# Patient Record
Sex: FEMALE | Race: WHITE | NOT HISPANIC OR LATINO | Employment: OTHER | URBAN - METROPOLITAN AREA
[De-identification: names, ages, dates, MRNs, and addresses within clinical notes are randomized per-mention and may not be internally consistent; named-entity substitution may affect disease eponyms.]

---

## 2017-12-08 ENCOUNTER — PATIENT OUTREACH (OUTPATIENT)
Dept: HEALTH INFORMATION MANAGEMENT | Facility: OTHER | Age: 72
End: 2017-12-08

## 2017-12-08 NOTE — PROGRESS NOTES
Outcome: Left Message    Please transfer to Patient Outreach Team at [phone] when patient returns call.    WebIZ Checked & Epic Updated:  no    HealthConnect Verified: yes    Attempt # 1

## 2017-12-20 ENCOUNTER — PATIENT OUTREACH (OUTPATIENT)
Dept: HEALTH INFORMATION MANAGEMENT | Facility: OTHER | Age: 72
End: 2017-12-20

## 2017-12-20 NOTE — PROGRESS NOTES
Outcome: Left Message    Please transfer to Patient Outreach Team at [phone] when patient returns call.    Attempt # 2

## 2017-12-20 NOTE — PROGRESS NOTES
Attempt #:1    HealthConnect Verified: yes  Verify PCP: yes    Communication Preference Obtained: yes     Review Care Team: yes    Care Gap Scheduling (Attempt to Schedule EACH Overdue Care Gap!)     Health Maintenance Due   Topic Date Due   • Annual Wellness Visit  1945   • IMM DTaP/Tdap/Td Vaccine (1 - Tdap) 01/19/1966   • PAP SMEAR  01/19/1968   • COLONOSCOPY  01/19/1997   • IMM ZOSTER VACCINE  01/19/2007   • IMM PNEUMOCOCCAL 65+ (ADULT) LOW/MEDIUM RISK SERIES (1 of 2 - PCV13) 01/19/2012   • MAMMOGRAM  04/24/2016   • IMM INFLUENZA (1) 09/01/2017        Scheduled patient for Mammogram       [facility] Activation: already active  [facility] [place]: no  Virtual Visits: no  Opt In to Text Messages: no

## 2018-10-11 ENCOUNTER — HOSPITAL ENCOUNTER (OUTPATIENT)
Dept: LAB | Facility: MEDICAL CENTER | Age: 73
End: 2018-10-11
Attending: NURSE PRACTITIONER
Payer: MEDICARE

## 2018-10-11 LAB
25(OH)D3 SERPL-MCNC: 52 NG/ML (ref 30–100)
ALBUMIN SERPL BCP-MCNC: 4.6 G/DL (ref 3.2–4.9)
ALBUMIN/GLOB SERPL: 1.6 G/DL
ALP SERPL-CCNC: 66 U/L (ref 30–99)
ALT SERPL-CCNC: 14 U/L (ref 2–50)
ANION GAP SERPL CALC-SCNC: 9 MMOL/L (ref 0–11.9)
AST SERPL-CCNC: 20 U/L (ref 12–45)
BILIRUB SERPL-MCNC: 0.5 MG/DL (ref 0.1–1.5)
BUN SERPL-MCNC: 20 MG/DL (ref 8–22)
CALCIUM SERPL-MCNC: 9.5 MG/DL (ref 8.5–10.5)
CHLORIDE SERPL-SCNC: 106 MMOL/L (ref 96–112)
CHOLEST SERPL-MCNC: 227 MG/DL (ref 100–199)
CO2 SERPL-SCNC: 24 MMOL/L (ref 20–33)
CREAT SERPL-MCNC: 0.92 MG/DL (ref 0.5–1.4)
FASTING STATUS PATIENT QL REPORTED: NORMAL
FOLATE SERPL-MCNC: >24 NG/ML
GLOBULIN SER CALC-MCNC: 2.8 G/DL (ref 1.9–3.5)
GLUCOSE SERPL-MCNC: 93 MG/DL (ref 65–99)
HDLC SERPL-MCNC: 80 MG/DL
LDLC SERPL CALC-MCNC: 137 MG/DL
MAGNESIUM SERPL-MCNC: 2.3 MG/DL (ref 1.5–2.5)
POTASSIUM SERPL-SCNC: 3.9 MMOL/L (ref 3.6–5.5)
PROT SERPL-MCNC: 7.4 G/DL (ref 6–8.2)
SODIUM SERPL-SCNC: 139 MMOL/L (ref 135–145)
T3 SERPL-MCNC: 101.5 NG/DL (ref 60–181)
T4 SERPL-MCNC: 9.4 UG/DL (ref 4–12)
TRIGL SERPL-MCNC: 51 MG/DL (ref 0–149)
TSH SERPL DL<=0.005 MIU/L-ACNC: 2.1 UIU/ML (ref 0.38–5.33)
VIT B12 SERPL-MCNC: 611 PG/ML (ref 211–911)

## 2018-10-11 PROCEDURE — 80053 COMPREHEN METABOLIC PANEL: CPT

## 2018-10-11 PROCEDURE — 84436 ASSAY OF TOTAL THYROXINE: CPT

## 2018-10-11 PROCEDURE — 82607 VITAMIN B-12: CPT

## 2018-10-11 PROCEDURE — 84443 ASSAY THYROID STIM HORMONE: CPT

## 2018-10-11 PROCEDURE — 80061 LIPID PANEL: CPT

## 2018-10-11 PROCEDURE — 82306 VITAMIN D 25 HYDROXY: CPT

## 2018-10-11 PROCEDURE — 84425 ASSAY OF VITAMIN B-1: CPT

## 2018-10-11 PROCEDURE — 84480 ASSAY TRIIODOTHYRONINE (T3): CPT

## 2018-10-11 PROCEDURE — 36415 COLL VENOUS BLD VENIPUNCTURE: CPT

## 2018-10-11 PROCEDURE — 83735 ASSAY OF MAGNESIUM: CPT

## 2018-10-11 PROCEDURE — 84207 ASSAY OF VITAMIN B-6: CPT

## 2018-10-11 PROCEDURE — 82746 ASSAY OF FOLIC ACID SERUM: CPT

## 2018-10-15 ENCOUNTER — HOSPITAL ENCOUNTER (OUTPATIENT)
Dept: RADIOLOGY | Facility: MEDICAL CENTER | Age: 73
End: 2018-10-15
Attending: INTERNAL MEDICINE
Payer: MEDICARE

## 2018-10-15 DIAGNOSIS — M25.552 LEFT HIP PAIN: ICD-10-CM

## 2018-10-15 PROCEDURE — 73721 MRI JNT OF LWR EXTRE W/O DYE: CPT | Mod: RT

## 2018-10-16 LAB — VIT B6 SERPL-MCNC: 28.3 NMOL/L (ref 20–125)

## 2018-10-17 ENCOUNTER — HOSPITAL ENCOUNTER (OUTPATIENT)
Dept: LAB | Facility: MEDICAL CENTER | Age: 73
End: 2018-10-17
Attending: NURSE PRACTITIONER
Payer: MEDICARE

## 2018-10-17 LAB
BASOPHILS # BLD AUTO: 1.1 % (ref 0–1.8)
BASOPHILS # BLD: 0.07 K/UL (ref 0–0.12)
EOSINOPHIL # BLD AUTO: 0.41 K/UL (ref 0–0.51)
EOSINOPHIL NFR BLD: 6.2 % (ref 0–6.9)
ERYTHROCYTE [DISTWIDTH] IN BLOOD BY AUTOMATED COUNT: 49.1 FL (ref 35.9–50)
EST. AVERAGE GLUCOSE BLD GHB EST-MCNC: 134 MG/DL
HBA1C MFR BLD: 6.3 % (ref 0–5.6)
HCT VFR BLD AUTO: 45.3 % (ref 37–47)
HGB BLD-MCNC: 15.2 G/DL (ref 12–16)
IMM GRANULOCYTES # BLD AUTO: 0.04 K/UL (ref 0–0.11)
IMM GRANULOCYTES NFR BLD AUTO: 0.6 % (ref 0–0.9)
LYMPHOCYTES # BLD AUTO: 2.34 K/UL (ref 1–4.8)
LYMPHOCYTES NFR BLD: 35.2 % (ref 22–41)
MCH RBC QN AUTO: 32.7 PG (ref 27–33)
MCHC RBC AUTO-ENTMCNC: 33.6 G/DL (ref 33.6–35)
MCV RBC AUTO: 97.4 FL (ref 81.4–97.8)
MONOCYTES # BLD AUTO: 0.65 K/UL (ref 0–0.85)
MONOCYTES NFR BLD AUTO: 9.8 % (ref 0–13.4)
NEUTROPHILS # BLD AUTO: 3.13 K/UL (ref 2–7.15)
NEUTROPHILS NFR BLD: 47.1 % (ref 44–72)
NRBC # BLD AUTO: 0 K/UL
NRBC BLD-RTO: 0 /100 WBC
PLATELET # BLD AUTO: 350 K/UL (ref 164–446)
PMV BLD AUTO: 9.8 FL (ref 9–12.9)
RBC # BLD AUTO: 4.65 M/UL (ref 4.2–5.4)
VIT B1 BLD-MCNC: 137 NMOL/L (ref 70–180)
WBC # BLD AUTO: 6.6 K/UL (ref 4.8–10.8)

## 2018-10-17 PROCEDURE — 85025 COMPLETE CBC W/AUTO DIFF WBC: CPT

## 2018-10-17 PROCEDURE — 83036 HEMOGLOBIN GLYCOSYLATED A1C: CPT

## 2018-12-24 DIAGNOSIS — Z01.812 PRE-OPERATIVE LABORATORY EXAMINATION: ICD-10-CM

## 2018-12-24 DIAGNOSIS — Z01.810 PRE-OPERATIVE CARDIOVASCULAR EXAMINATION: ICD-10-CM

## 2018-12-24 LAB
ANION GAP SERPL CALC-SCNC: 7 MMOL/L (ref 0–11.9)
APPEARANCE UR: CLEAR
BACTERIA #/AREA URNS HPF: NEGATIVE /HPF
BILIRUB UR QL STRIP.AUTO: NEGATIVE
BUN SERPL-MCNC: 25 MG/DL (ref 8–22)
CALCIUM SERPL-MCNC: 9.7 MG/DL (ref 8.5–10.5)
CAOX CRY #/AREA URNS HPF: NORMAL /HPF
CHLORIDE SERPL-SCNC: 108 MMOL/L (ref 96–112)
CO2 SERPL-SCNC: 27 MMOL/L (ref 20–33)
COLOR UR: YELLOW
CREAT SERPL-MCNC: 0.97 MG/DL (ref 0.5–1.4)
EKG IMPRESSION: NORMAL
EPI CELLS #/AREA URNS HPF: NEGATIVE /HPF
ERYTHROCYTE [DISTWIDTH] IN BLOOD BY AUTOMATED COUNT: 50 FL (ref 35.9–50)
GLUCOSE SERPL-MCNC: 88 MG/DL (ref 65–99)
GLUCOSE UR STRIP.AUTO-MCNC: NEGATIVE MG/DL
HCT VFR BLD AUTO: 45.6 % (ref 37–47)
HGB BLD-MCNC: 14.7 G/DL (ref 12–16)
HYALINE CASTS #/AREA URNS LPF: NORMAL /LPF
KETONES UR STRIP.AUTO-MCNC: ABNORMAL MG/DL
LEUKOCYTE ESTERASE UR QL STRIP.AUTO: ABNORMAL
MCH RBC QN AUTO: 32.3 PG (ref 27–33)
MCHC RBC AUTO-ENTMCNC: 32.2 G/DL (ref 33.6–35)
MCV RBC AUTO: 100.2 FL (ref 81.4–97.8)
MICRO URNS: ABNORMAL
NITRITE UR QL STRIP.AUTO: NEGATIVE
PH UR STRIP.AUTO: 6 [PH]
PLATELET # BLD AUTO: 336 K/UL (ref 164–446)
PMV BLD AUTO: 9.9 FL (ref 9–12.9)
POTASSIUM SERPL-SCNC: 4.4 MMOL/L (ref 3.6–5.5)
PROT UR QL STRIP: NEGATIVE MG/DL
RBC # BLD AUTO: 4.55 M/UL (ref 4.2–5.4)
RBC # URNS HPF: NORMAL /HPF
RBC UR QL AUTO: NEGATIVE
SCCMEC + MECA PNL NOSE NAA+PROBE: NEGATIVE
SCCMEC + MECA PNL NOSE NAA+PROBE: POSITIVE
SODIUM SERPL-SCNC: 142 MMOL/L (ref 135–145)
SP GR UR STRIP.AUTO: 1.02
UROBILINOGEN UR STRIP.AUTO-MCNC: 0.2 MG/DL
WBC # BLD AUTO: 9 K/UL (ref 4.8–10.8)
WBC #/AREA URNS HPF: NORMAL /HPF

## 2018-12-24 PROCEDURE — 36415 COLL VENOUS BLD VENIPUNCTURE: CPT

## 2018-12-24 PROCEDURE — 87640 STAPH A DNA AMP PROBE: CPT | Mod: XU

## 2018-12-24 PROCEDURE — 87086 URINE CULTURE/COLONY COUNT: CPT

## 2018-12-24 PROCEDURE — 87641 MR-STAPH DNA AMP PROBE: CPT

## 2018-12-24 PROCEDURE — 93010 ELECTROCARDIOGRAM REPORT: CPT | Performed by: INTERNAL MEDICINE

## 2018-12-24 PROCEDURE — 80048 BASIC METABOLIC PNL TOTAL CA: CPT

## 2018-12-24 PROCEDURE — 85027 COMPLETE CBC AUTOMATED: CPT

## 2018-12-24 PROCEDURE — 81001 URINALYSIS AUTO W/SCOPE: CPT

## 2018-12-24 PROCEDURE — 93005 ELECTROCARDIOGRAM TRACING: CPT

## 2018-12-24 RX ORDER — SENNOSIDES 8.6 MG
650 CAPSULE ORAL EVERY 8 HOURS PRN
Status: ON HOLD | COMMUNITY
End: 2019-01-08

## 2018-12-24 RX ORDER — DIPHENHYDRAMINE HCL 25 MG
25 TABLET ORAL EVERY 6 HOURS PRN
COMMUNITY

## 2018-12-24 RX ORDER — PHENOL 1.4 %
10 AEROSOL, SPRAY (ML) MUCOUS MEMBRANE
Status: ON HOLD | COMMUNITY
End: 2019-01-08

## 2018-12-24 RX ORDER — SILICONE ADHESIVE 1.5" X 3"
400 SHEET (EA) TOPICAL DAILY
Status: ON HOLD | COMMUNITY
End: 2019-01-08

## 2018-12-24 RX ORDER — ECHINACEA 400 MG
1000 CAPSULE ORAL DAILY
Status: ON HOLD | COMMUNITY
End: 2019-01-08

## 2018-12-24 RX ORDER — ECHINACEA PURPUREA EXTRACT 125 MG
2 TABLET ORAL PRN
COMMUNITY

## 2018-12-24 RX ORDER — MULTIVIT WITH MINERALS/LUTEIN
1 TABLET ORAL DAILY
Status: ON HOLD | COMMUNITY
End: 2019-01-08

## 2018-12-24 RX ORDER — ACETAMINOPHEN 500 MG
1000 TABLET ORAL EVERY 6 HOURS PRN
Status: ON HOLD | COMMUNITY
End: 2019-01-08

## 2018-12-24 RX ORDER — ANTIARTHRITIC COMBINATION NO.2 900 MG
25 TABLET ORAL DAILY
Status: ON HOLD | COMMUNITY
End: 2019-01-08

## 2018-12-24 NOTE — DISCHARGE PLANNING
DISCHARGE PLANNING NOTE - TOTAL JOINT     Procedure: Procedure(s):  HIP ARTHROPLASTY TOTAL  Procedure Date: 1/7/2019  Insurance:  Payor: S[employer]IOR [employer] PLUS / Plan: SENIOR C[employer] PLUS / Product Type: *No Product type* /   Equipment currently available at home? cane and reachers  Steps into the home? Threshold x1  Steps within the home? none  Toilet height? Standard  Type of shower? tub-shower has hose  Who will be with you during your recovery? Spouse--Kevin  Is Outpatient Physical Therapy set up after surgery? Yes-1/14/19  Did you take the Total Joint Class and where? Yes, at Dr. Miranda's office     Plan: Met with patient during preadmission appointment.  Reviewed Equipment Resource list and provided a copy to the patient with Care Chest recommendation, pt states she has ordered a shower chair and toilet riser but will return them and go to [facility].  Provided and reviewed Home Safety Checklist.  Quiet Hours form given and reviewed.  There are no identified discharge needs at this time.  Anticipate discharge home with family.   Patient is planning to get DME during admission and is aware of the plan.  No DME orders at this time.

## 2018-12-24 NOTE — OR NURSING
"Pre-admit appointment completed. \"Preparing for your procedure\" sheet given to pt along with verbal and written instructions. Pt instructed to continue regularly prescribed medications through the day before surgery. Pt instructed to take the following medications the day of surgery with a sip of water, per anesthesia protocol; if needed-tylenol.   "

## 2018-12-26 LAB
BACTERIA UR CULT: NORMAL
SIGNIFICANT IND 70042: NORMAL
SITE SITE: NORMAL
SOURCE SOURCE: NORMAL

## 2019-01-07 ENCOUNTER — APPOINTMENT (OUTPATIENT)
Dept: RADIOLOGY | Facility: MEDICAL CENTER | Age: 74
DRG: 470 | End: 2019-01-07
Attending: ORTHOPAEDIC SURGERY
Payer: MEDICARE

## 2019-01-07 ENCOUNTER — HOSPITAL ENCOUNTER (INPATIENT)
Facility: MEDICAL CENTER | Age: 74
LOS: 1 days | DRG: 470 | End: 2019-01-08
Attending: ORTHOPAEDIC SURGERY | Admitting: ORTHOPAEDIC SURGERY
Payer: MEDICARE

## 2019-01-07 PROCEDURE — 700111 HCHG RX REV CODE 636 W/ 250 OVERRIDE (IP)

## 2019-01-07 PROCEDURE — 500562 HCHG FIBERWIRE: Performed by: ORTHOPAEDIC SURGERY

## 2019-01-07 PROCEDURE — 160035 HCHG PACU - 1ST 60 MINS PHASE I: Performed by: ORTHOPAEDIC SURGERY

## 2019-01-07 PROCEDURE — A9270 NON-COVERED ITEM OR SERVICE: HCPCS | Performed by: ORTHOPAEDIC SURGERY

## 2019-01-07 PROCEDURE — 501445 HCHG STAPLER, SKIN DISP: Performed by: ORTHOPAEDIC SURGERY

## 2019-01-07 PROCEDURE — 0LQJ0ZZ REPAIR RIGHT HIP TENDON, OPEN APPROACH: ICD-10-PCS | Performed by: ORTHOPAEDIC SURGERY

## 2019-01-07 PROCEDURE — A9270 NON-COVERED ITEM OR SERVICE: HCPCS

## 2019-01-07 PROCEDURE — 700102 HCHG RX REV CODE 250 W/ 637 OVERRIDE(OP): Performed by: ORTHOPAEDIC SURGERY

## 2019-01-07 PROCEDURE — 502000 HCHG MISC OR IMPLANTS RC 0278: Performed by: ORTHOPAEDIC SURGERY

## 2019-01-07 PROCEDURE — 160009 HCHG ANES TIME/MIN: Performed by: ORTHOPAEDIC SURGERY

## 2019-01-07 PROCEDURE — 160048 HCHG OR STATISTICAL LEVEL 1-5: Performed by: ORTHOPAEDIC SURGERY

## 2019-01-07 PROCEDURE — 500864 HCHG NEEDLE, SPINAL 18G: Performed by: ORTHOPAEDIC SURGERY

## 2019-01-07 PROCEDURE — 500002 HCHG ADHESIVE, DERMABOND: Performed by: ORTHOPAEDIC SURGERY

## 2019-01-07 PROCEDURE — 700112 HCHG RX REV CODE 229: Performed by: ORTHOPAEDIC SURGERY

## 2019-01-07 PROCEDURE — 700101 HCHG RX REV CODE 250

## 2019-01-07 PROCEDURE — 501838 HCHG SUTURE GENERAL: Performed by: ORTHOPAEDIC SURGERY

## 2019-01-07 PROCEDURE — 700111 HCHG RX REV CODE 636 W/ 250 OVERRIDE (IP): Performed by: ORTHOPAEDIC SURGERY

## 2019-01-07 PROCEDURE — 700105 HCHG RX REV CODE 258: Performed by: ORTHOPAEDIC SURGERY

## 2019-01-07 PROCEDURE — 770001 HCHG ROOM/CARE - MED/SURG/GYN PRIV*

## 2019-01-07 PROCEDURE — 160002 HCHG RECOVERY MINUTES (STAT): Performed by: ORTHOPAEDIC SURGERY

## 2019-01-07 PROCEDURE — 160031 HCHG SURGERY MINUTES - 1ST 30 MINS LEVEL 5: Performed by: ORTHOPAEDIC SURGERY

## 2019-01-07 PROCEDURE — 73502 X-RAY EXAM HIP UNI 2-3 VIEWS: CPT | Mod: RT

## 2019-01-07 PROCEDURE — 0SR904A REPLACEMENT OF RIGHT HIP JOINT WITH CERAMIC ON POLYETHYLENE SYNTHETIC SUBSTITUTE, UNCEMENTED, OPEN APPROACH: ICD-10-PCS | Performed by: ORTHOPAEDIC SURGERY

## 2019-01-07 PROCEDURE — 94760 N-INVAS EAR/PLS OXIMETRY 1: CPT

## 2019-01-07 PROCEDURE — 160042 HCHG SURGERY MINUTES - EA ADDL 1 MIN LEVEL 5: Performed by: ORTHOPAEDIC SURGERY

## 2019-01-07 PROCEDURE — 700102 HCHG RX REV CODE 250 W/ 637 OVERRIDE(OP)

## 2019-01-07 PROCEDURE — 502578 HCHG PACK, TOTAL HIP: Performed by: ORTHOPAEDIC SURGERY

## 2019-01-07 DEVICE — IMPLANTABLE DEVICE: Type: IMPLANTABLE DEVICE | Site: HIP | Status: FUNCTIONAL

## 2019-01-07 RX ORDER — CALCIUM CARBONATE 500 MG/1
1000 TABLET, CHEWABLE ORAL EVERY 4 HOURS PRN
Status: DISCONTINUED | OUTPATIENT
Start: 2019-01-07 | End: 2019-01-08 | Stop reason: HOSPADM

## 2019-01-07 RX ORDER — OXYCODONE HCL 5 MG/5 ML
5 SOLUTION, ORAL ORAL
Status: DISCONTINUED | OUTPATIENT
Start: 2019-01-07 | End: 2019-01-07 | Stop reason: HOSPADM

## 2019-01-07 RX ORDER — HYDROMORPHONE HYDROCHLORIDE 1 MG/ML
0.2 INJECTION, SOLUTION INTRAMUSCULAR; INTRAVENOUS; SUBCUTANEOUS
Status: DISCONTINUED | OUTPATIENT
Start: 2019-01-07 | End: 2019-01-07 | Stop reason: HOSPADM

## 2019-01-07 RX ORDER — GABAPENTIN 300 MG/1
CAPSULE ORAL
Status: COMPLETED
Start: 2019-01-07 | End: 2019-01-07

## 2019-01-07 RX ORDER — KETOROLAC TROMETHAMINE 30 MG/ML
15 INJECTION, SOLUTION INTRAMUSCULAR; INTRAVENOUS EVERY 6 HOURS
Status: DISCONTINUED | OUTPATIENT
Start: 2019-01-07 | End: 2019-01-08 | Stop reason: HOSPADM

## 2019-01-07 RX ORDER — MEPERIDINE HYDROCHLORIDE 25 MG/ML
6.25 INJECTION INTRAMUSCULAR; INTRAVENOUS; SUBCUTANEOUS
Status: DISCONTINUED | OUTPATIENT
Start: 2019-01-07 | End: 2019-01-07 | Stop reason: HOSPADM

## 2019-01-07 RX ORDER — AMOXICILLIN 250 MG
1 CAPSULE ORAL
Status: DISCONTINUED | OUTPATIENT
Start: 2019-01-07 | End: 2019-01-08 | Stop reason: HOSPADM

## 2019-01-07 RX ORDER — HALOPERIDOL 5 MG/ML
1 INJECTION INTRAMUSCULAR
Status: DISCONTINUED | OUTPATIENT
Start: 2019-01-07 | End: 2019-01-07 | Stop reason: HOSPADM

## 2019-01-07 RX ORDER — DOCUSATE SODIUM 100 MG/1
100 CAPSULE, LIQUID FILLED ORAL 2 TIMES DAILY
Status: DISCONTINUED | OUTPATIENT
Start: 2019-01-07 | End: 2019-01-08 | Stop reason: HOSPADM

## 2019-01-07 RX ORDER — OXYCODONE HCL 5 MG/5 ML
10 SOLUTION, ORAL ORAL
Status: DISCONTINUED | OUTPATIENT
Start: 2019-01-07 | End: 2019-01-07 | Stop reason: HOSPADM

## 2019-01-07 RX ORDER — DIPHENHYDRAMINE HYDROCHLORIDE 50 MG/ML
25 INJECTION INTRAMUSCULAR; INTRAVENOUS EVERY 6 HOURS PRN
Status: DISCONTINUED | OUTPATIENT
Start: 2019-01-07 | End: 2019-01-08 | Stop reason: HOSPADM

## 2019-01-07 RX ORDER — OXYCODONE HYDROCHLORIDE 5 MG/1
5 TABLET ORAL
Status: DISCONTINUED | OUTPATIENT
Start: 2019-01-07 | End: 2019-01-08 | Stop reason: HOSPADM

## 2019-01-07 RX ORDER — HYDRALAZINE HYDROCHLORIDE 20 MG/ML
5 INJECTION INTRAMUSCULAR; INTRAVENOUS
Status: DISCONTINUED | OUTPATIENT
Start: 2019-01-07 | End: 2019-01-07 | Stop reason: HOSPADM

## 2019-01-07 RX ORDER — ONDANSETRON 2 MG/ML
4 INJECTION INTRAMUSCULAR; INTRAVENOUS EVERY 4 HOURS PRN
Status: DISCONTINUED | OUTPATIENT
Start: 2019-01-07 | End: 2019-01-08 | Stop reason: HOSPADM

## 2019-01-07 RX ORDER — ACETAMINOPHEN 500 MG
TABLET ORAL
Status: COMPLETED
Start: 2019-01-07 | End: 2019-01-07

## 2019-01-07 RX ORDER — OXYCODONE HYDROCHLORIDE 10 MG/1
10 TABLET ORAL
Status: DISCONTINUED | OUTPATIENT
Start: 2019-01-07 | End: 2019-01-08 | Stop reason: HOSPADM

## 2019-01-07 RX ORDER — SCOLOPAMINE TRANSDERMAL SYSTEM 1 MG/1
1 PATCH, EXTENDED RELEASE TRANSDERMAL
Status: DISCONTINUED | OUTPATIENT
Start: 2019-01-07 | End: 2019-01-08 | Stop reason: HOSPADM

## 2019-01-07 RX ORDER — ROPIVACAINE HYDROCHLORIDE 5 MG/ML
INJECTION, SOLUTION EPIDURAL; INFILTRATION; PERINEURAL
Status: DISCONTINUED | OUTPATIENT
Start: 2019-01-07 | End: 2019-01-07 | Stop reason: HOSPADM

## 2019-01-07 RX ORDER — CELECOXIB 200 MG/1
CAPSULE ORAL
Status: COMPLETED
Start: 2019-01-07 | End: 2019-01-07

## 2019-01-07 RX ORDER — AMOXICILLIN 250 MG
1 CAPSULE ORAL NIGHTLY
Status: DISCONTINUED | OUTPATIENT
Start: 2019-01-07 | End: 2019-01-08 | Stop reason: HOSPADM

## 2019-01-07 RX ORDER — KETOROLAC TROMETHAMINE 30 MG/ML
INJECTION, SOLUTION INTRAMUSCULAR; INTRAVENOUS
Status: DISCONTINUED | OUTPATIENT
Start: 2019-01-07 | End: 2019-01-07 | Stop reason: HOSPADM

## 2019-01-07 RX ORDER — HYDROMORPHONE HYDROCHLORIDE 1 MG/ML
0.4 INJECTION, SOLUTION INTRAMUSCULAR; INTRAVENOUS; SUBCUTANEOUS
Status: DISCONTINUED | OUTPATIENT
Start: 2019-01-07 | End: 2019-01-07 | Stop reason: HOSPADM

## 2019-01-07 RX ORDER — SODIUM CHLORIDE, SODIUM LACTATE, POTASSIUM CHLORIDE, CALCIUM CHLORIDE 600; 310; 30; 20 MG/100ML; MG/100ML; MG/100ML; MG/100ML
INJECTION, SOLUTION INTRAVENOUS CONTINUOUS
Status: DISCONTINUED | OUTPATIENT
Start: 2019-01-07 | End: 2019-01-07 | Stop reason: HOSPADM

## 2019-01-07 RX ORDER — SODIUM CHLORIDE, SODIUM LACTATE, POTASSIUM CHLORIDE, CALCIUM CHLORIDE 600; 310; 30; 20 MG/100ML; MG/100ML; MG/100ML; MG/100ML
INJECTION, SOLUTION INTRAVENOUS ONCE
Status: COMPLETED | OUTPATIENT
Start: 2019-01-07 | End: 2019-01-07

## 2019-01-07 RX ORDER — POLYETHYLENE GLYCOL 3350 17 G/17G
1 POWDER, FOR SOLUTION ORAL 2 TIMES DAILY PRN
Status: DISCONTINUED | OUTPATIENT
Start: 2019-01-07 | End: 2019-01-08 | Stop reason: HOSPADM

## 2019-01-07 RX ORDER — DIPHENHYDRAMINE HYDROCHLORIDE 50 MG/ML
12.5 INJECTION INTRAMUSCULAR; INTRAVENOUS
Status: DISCONTINUED | OUTPATIENT
Start: 2019-01-07 | End: 2019-01-07 | Stop reason: HOSPADM

## 2019-01-07 RX ORDER — BACITRACIN 50000 [IU]/1
INJECTION, POWDER, FOR SOLUTION INTRAMUSCULAR
Status: DISCONTINUED | OUTPATIENT
Start: 2019-01-07 | End: 2019-01-07 | Stop reason: HOSPADM

## 2019-01-07 RX ORDER — HYDROMORPHONE HYDROCHLORIDE 1 MG/ML
0.1 INJECTION, SOLUTION INTRAMUSCULAR; INTRAVENOUS; SUBCUTANEOUS
Status: DISCONTINUED | OUTPATIENT
Start: 2019-01-07 | End: 2019-01-07 | Stop reason: HOSPADM

## 2019-01-07 RX ORDER — BISACODYL 10 MG
10 SUPPOSITORY, RECTAL RECTAL
Status: DISCONTINUED | OUTPATIENT
Start: 2019-01-07 | End: 2019-01-08 | Stop reason: HOSPADM

## 2019-01-07 RX ORDER — MORPHINE SULFATE 4 MG/ML
4 INJECTION, SOLUTION INTRAMUSCULAR; INTRAVENOUS
Status: DISCONTINUED | OUTPATIENT
Start: 2019-01-07 | End: 2019-01-08 | Stop reason: HOSPADM

## 2019-01-07 RX ORDER — DEXAMETHASONE SODIUM PHOSPHATE 4 MG/ML
4 INJECTION, SOLUTION INTRA-ARTICULAR; INTRALESIONAL; INTRAMUSCULAR; INTRAVENOUS; SOFT TISSUE
Status: DISCONTINUED | OUTPATIENT
Start: 2019-01-07 | End: 2019-01-08 | Stop reason: HOSPADM

## 2019-01-07 RX ORDER — ACETAMINOPHEN 500 MG
1000 TABLET ORAL EVERY 6 HOURS
Status: DISCONTINUED | OUTPATIENT
Start: 2019-01-07 | End: 2019-01-08 | Stop reason: HOSPADM

## 2019-01-07 RX ORDER — ONDANSETRON 2 MG/ML
4 INJECTION INTRAMUSCULAR; INTRAVENOUS
Status: DISCONTINUED | OUTPATIENT
Start: 2019-01-07 | End: 2019-01-07 | Stop reason: HOSPADM

## 2019-01-07 RX ORDER — ENEMA 19; 7 G/133ML; G/133ML
1 ENEMA RECTAL
Status: DISCONTINUED | OUTPATIENT
Start: 2019-01-07 | End: 2019-01-08 | Stop reason: HOSPADM

## 2019-01-07 RX ORDER — MORPHINE SULFATE 4 MG/ML
INJECTION, SOLUTION INTRAMUSCULAR; INTRAVENOUS
Status: DISCONTINUED | OUTPATIENT
Start: 2019-01-07 | End: 2019-01-07 | Stop reason: HOSPADM

## 2019-01-07 RX ADMIN — SODIUM CHLORIDE, SODIUM LACTATE, POTASSIUM CHLORIDE, CALCIUM CHLORIDE 1000 ML: 600; 310; 30; 20 INJECTION, SOLUTION INTRAVENOUS at 08:10

## 2019-01-07 RX ADMIN — ACETAMINOPHEN 1000 MG: 500 TABLET, FILM COATED ORAL at 16:58

## 2019-01-07 RX ADMIN — KETOROLAC TROMETHAMINE 15 MG: 30 INJECTION, SOLUTION INTRAMUSCULAR at 16:59

## 2019-01-07 RX ADMIN — DOCUSATE SODIUM 100 MG: 100 CAPSULE, LIQUID FILLED ORAL at 16:58

## 2019-01-07 RX ADMIN — SODIUM CHLORIDE 2 G: 9 INJECTION, SOLUTION INTRAVENOUS at 16:59

## 2019-01-07 RX ADMIN — KETOROLAC TROMETHAMINE 15 MG: 30 INJECTION, SOLUTION INTRAMUSCULAR at 12:24

## 2019-01-07 RX ADMIN — DOCUSATE SODIUM AND SENNOSIDES 1 TABLET: 8.6; 5 TABLET, FILM COATED ORAL at 22:17

## 2019-01-07 RX ADMIN — OXYCODONE HYDROCHLORIDE 5 MG: 5 TABLET ORAL at 22:16

## 2019-01-07 RX ADMIN — ASPIRIN 325 MG: 325 TABLET, DELAYED RELEASE ORAL at 22:17

## 2019-01-07 ASSESSMENT — COGNITIVE AND FUNCTIONAL STATUS - GENERAL
DAILY ACTIVITIY SCORE: 18
DRESSING REGULAR UPPER BODY CLOTHING: A LITTLE
SUGGESTED CMS G CODE MODIFIER MOBILITY: CK
STANDING UP FROM CHAIR USING ARMS: A LITTLE
EATING MEALS: A LITTLE
DRESSING REGULAR LOWER BODY CLOTHING: A LITTLE
MOVING TO AND FROM BED TO CHAIR: A LITTLE
CLIMB 3 TO 5 STEPS WITH RAILING: A LITTLE
WALKING IN HOSPITAL ROOM: A LITTLE
MOVING FROM LYING ON BACK TO SITTING ON SIDE OF FLAT BED: A LITTLE
HELP NEEDED FOR BATHING: A LITTLE
TOILETING: A LITTLE
SUGGESTED CMS G CODE MODIFIER DAILY ACTIVITY: CK
PERSONAL GROOMING: A LITTLE
MOBILITY SCORE: 18
TURNING FROM BACK TO SIDE WHILE IN FLAT BAD: A LITTLE

## 2019-01-07 ASSESSMENT — PATIENT HEALTH QUESTIONNAIRE - PHQ9
2. FEELING DOWN, DEPRESSED, IRRITABLE, OR HOPELESS: NOT AT ALL
SUM OF ALL RESPONSES TO PHQ9 QUESTIONS 1 AND 2: 0
1. LITTLE INTEREST OR PLEASURE IN DOING THINGS: NOT AT ALL

## 2019-01-07 ASSESSMENT — PAIN SCALES - GENERAL
PAINLEVEL_OUTOF10: 0
PAINLEVEL_OUTOF10: 0
PAINLEVEL_OUTOF10: 2
PAINLEVEL_OUTOF10: 5
PAINLEVEL_OUTOF10: 4
PAINLEVEL_OUTOF10: 5

## 2019-01-07 ASSESSMENT — LIFESTYLE VARIABLES
EVER_SMOKED: YES
ALCOHOL_USE: NO

## 2019-01-07 NOTE — PROGRESS NOTES
Pt from PACU. Awake and alert. No signs of distress noted. VSS on admit. Able to wiggle both feet. DSG CDI. Polar ice placed. Pt denies nausea. Chicken broth and crackers provided.

## 2019-01-07 NOTE — CARE PLAN
Problem: Communication  Goal: The ability to communicate needs accurately and effectively will improve  Pt a/o x 4, able to make needs known to staff.     Problem: Venous Thromboembolism (VTW)/Deep Vein Thrombosis (DVT) Prevention:  Goal: Patient will participate in Venous Thrombosis (VTE)/Deep Vein Thrombosis (DVT)Prevention Measures  SCDs on, Teds on, Aspirin ordered.

## 2019-01-07 NOTE — FLOWSHEET NOTE
01/07/19 1336   Incentive Spirometry Group   Incentive Spirometry Instruction Yes   Breathing Exercises Yes   Incentive Spirometer Volume 2000 mL   Incentive Spirometer Date Last Changed 01/07/19   Chest Exam   Respiration 14   Heart Rate (Monitored) 80   Oximetry   #Pulse Oximetry (Single Determination) Yes   Continuous Oximetry Yes   Oxygen   Home O2 Use Prior To Admission? No   Pulse Oximetry 92 %   O2 (LPM) 0   O2 Daily Delivery Respiratory  Room Air with O2 Available

## 2019-01-07 NOTE — OR NURSING
1023: To PACU post right total hip arthroplasty. Pt is extubated, breathing is spontaneous and unlabored. Strong pulse noted.  1035: Pt states pain is 5/10, tolerable.  1055: Pain remains tolerable, no nausea. Meets criteria for transfer to room.

## 2019-01-08 VITALS
RESPIRATION RATE: 18 BRPM | HEIGHT: 61 IN | HEART RATE: 71 BPM | DIASTOLIC BLOOD PRESSURE: 85 MMHG | OXYGEN SATURATION: 94 % | SYSTOLIC BLOOD PRESSURE: 121 MMHG | BODY MASS INDEX: 31.13 KG/M2 | WEIGHT: 164.9 LBS | TEMPERATURE: 97.8 F

## 2019-01-08 PROCEDURE — 97165 OT EVAL LOW COMPLEX 30 MIN: CPT

## 2019-01-08 PROCEDURE — 700111 HCHG RX REV CODE 636 W/ 250 OVERRIDE (IP): Performed by: ORTHOPAEDIC SURGERY

## 2019-01-08 PROCEDURE — A9270 NON-COVERED ITEM OR SERVICE: HCPCS | Performed by: ORTHOPAEDIC SURGERY

## 2019-01-08 PROCEDURE — 700112 HCHG RX REV CODE 229: Performed by: ORTHOPAEDIC SURGERY

## 2019-01-08 PROCEDURE — 700105 HCHG RX REV CODE 258: Performed by: ORTHOPAEDIC SURGERY

## 2019-01-08 PROCEDURE — 97162 PT EVAL MOD COMPLEX 30 MIN: CPT

## 2019-01-08 PROCEDURE — 700102 HCHG RX REV CODE 250 W/ 637 OVERRIDE(OP): Performed by: ORTHOPAEDIC SURGERY

## 2019-01-08 RX ADMIN — OXYCODONE HYDROCHLORIDE 5 MG: 5 TABLET ORAL at 01:24

## 2019-01-08 RX ADMIN — OXYCODONE HYDROCHLORIDE 5 MG: 5 TABLET ORAL at 09:24

## 2019-01-08 RX ADMIN — OXYCODONE HYDROCHLORIDE 5 MG: 5 TABLET ORAL at 06:41

## 2019-01-08 RX ADMIN — KETOROLAC TROMETHAMINE 15 MG: 30 INJECTION, SOLUTION INTRAMUSCULAR at 06:41

## 2019-01-08 RX ADMIN — KETOROLAC TROMETHAMINE 15 MG: 30 INJECTION, SOLUTION INTRAMUSCULAR at 01:25

## 2019-01-08 RX ADMIN — SODIUM CHLORIDE 2 G: 9 INJECTION, SOLUTION INTRAVENOUS at 01:36

## 2019-01-08 RX ADMIN — DOCUSATE SODIUM 100 MG: 100 CAPSULE, LIQUID FILLED ORAL at 06:41

## 2019-01-08 RX ADMIN — ASPIRIN 325 MG: 325 TABLET, DELAYED RELEASE ORAL at 09:24

## 2019-01-08 RX ADMIN — ACETAMINOPHEN 1000 MG: 500 TABLET, FILM COATED ORAL at 01:25

## 2019-01-08 RX ADMIN — ACETAMINOPHEN 1000 MG: 500 TABLET, FILM COATED ORAL at 06:41

## 2019-01-08 ASSESSMENT — GAIT ASSESSMENTS
DEVIATION: STEP TO
GAIT LEVEL OF ASSIST: STAND BY ASSIST
ASSISTIVE DEVICE: CRUTCHES
DISTANCE (FEET): 150

## 2019-01-08 ASSESSMENT — ACTIVITIES OF DAILY LIVING (ADL): TOILETING: INDEPENDENT

## 2019-01-08 ASSESSMENT — PAIN SCALES - GENERAL
PAINLEVEL_OUTOF10: 5
PAINLEVEL_OUTOF10: 3

## 2019-01-08 ASSESSMENT — COGNITIVE AND FUNCTIONAL STATUS - GENERAL
DRESSING REGULAR LOWER BODY CLOTHING: A LITTLE
SUGGESTED CMS G CODE MODIFIER DAILY ACTIVITY: CJ
HELP NEEDED FOR BATHING: A LITTLE
DAILY ACTIVITIY SCORE: 21
TOILETING: A LITTLE

## 2019-01-08 NOTE — PROGRESS NOTES
Pt discharged to home via wheelchair escorted with CNA.  All discharge instructions given, questions and concerns addressed.   All prescriptions given post op.  PIV removed without complications.  Follow up appt discussed.   Crutches given before DC.  All belongings with pt.

## 2019-01-08 NOTE — PROGRESS NOTES
Progress Note               Author: Malachi Miranda Date & Time created: 2019  8:58 AM     Interval History:  No complaints  Pain controlled  Tolerating PO    Review of Systems:  ROS    Physical Exam:  Physical Exam  LE with neg homans, no sign of DVT  Bandage stable with no signs of drainage  ABD soft  No complaints of orthostasis  Labs:          No results for input(s): SODIUM, POTASSIUM, CHLORIDE, CO2, BUN, CREATININE, MAGNESIUM, PHOSPHORUS, CALCIUM in the last 72 hours.  No results for input(s): ALTSGPT, ASTSGOT, ALKPHOSPHAT, TBILIRUBIN, DBILIRUBIN, GAMMAGT, AMYLASE, LIPASE, ALB, PREALBUMIN, GLUCOSE in the last 72 hours.  No results for input(s): RBC, HEMOGLOBIN, HEMATOCRIT, PLATELETCT, PROTHROMBTM, APTT, INR, IRON, FERRITIN, TOTIRONBC in the last 72 hours.      Hemodynamics:  Temp (24hrs), Av.4 °C (97.5 °F), Min:36.2 °C (97.2 °F), Max:36.6 °C (97.8 °F)  Temperature: 36.6 °C (97.8 °F)  Pulse  Av.9  Min: 67  Max: 92Heart Rate (Monitored): 80  Blood Pressure : 121/85     Respiratory:    Respiration: 18, Pulse Oximetry: 94 %, O2 Daily Delivery Respiratory : Room Air with O2 Available           Fluids:    Intake/Output Summary (Last 24 hours) at 19 0858  Last data filed at 19 0500   Gross per 24 hour   Intake             2368 ml   Output             2400 ml   Net              -32 ml        GI/Nutrition:  Orders Placed This Encounter   Procedures   • Diet Order Regular     Standing Status:   Standing     Number of Occurrences:   1     Order Specific Question:   Diet:     Answer:   Regular [1]     Order Specific Question:   Miscellaneous modifications:     Answer:   Lactose Free per PT [7]     Medical Decision Making, by Problem:  There are no active hospital problems to display for this patient.      Plan:  1. DC home  2. DC home on home meds  3. DC home on [place], ASA, Meloxicam  4. Outpatient PT to begin in 2-4 days.  5.  Follow up [facility] 10-14 days  6. Call for Fevers, drainage,  redness, shortness of breath, or increasing extremity  swelling particularly in the calf.  7. Start Aspirin 325mg per day. Use for thirty days.   8. Remove bandage in 5 days.  Replace sooner for drainage and notify office.  9.  Wear FERNANDO (compressive stocking) for 2 weeks on both lower extremities.  10.. 75% Weightbearing on surgical leg with crutches.    Quality-Core Measures

## 2019-01-08 NOTE — PROGRESS NOTES
Pt is AAO x4.  Pt reports a 3/10 R hip/glute pain level.  Understands when next pain med is due.  R hip dressing in place, CDI.  L PIV patent, saline locked.  Pt up in chair for bfast.   VS WNL.  POC discussed.  All needs met at this time.  Bed in low position.  Call light within reach.  Rounding in place.

## 2019-01-08 NOTE — DISCHARGE INSTRUCTIONS
Discharge Instructions    Discharged to home by car with relative. Discharged via wheelchair, hospital escort: Yes.  Special equipment needed: Crutches    Be sure to schedule a follow-up appointment with your primary care doctor or any specialists as instructed.     LAST OXYCODONE given at 930am 1/8/19  LAST ASA given at 930am 1/8/19    DC home on crutches/ walker   2. DC home on home meds per home doses   3. DC home on Percocet, ASA, and Outpt PT.   4. Follow up [facility] 10-14 days   5. Call for Fevers, drainage, redness, shortness of breath, or increasing lower extremity          swelling particularly in the calf.   6. Start Aspirin 325mg BID. Use for thirty days.   7. Remove bandage on postop day 5, call prior if drainage or wetness.    Discharge Plan:   Diet Plan: Discussed  Activity Level: Discussed  Confirmed Follow up Appointment: Patient to Call and Schedule Appointment  Confirmed Symptoms Management: Discussed  Medication Reconciliation Updated: Yes  Pneumococcal Vaccine Administered/Refused: Not given - Patient refused pneumococcal vaccine  Influenza Vaccine Indication: Patient Refuses    I understand that a diet low in cholesterol, fat, and sodium is recommended for good health. Unless I have been given specific instructions below for another diet, I accept this instruction as my diet prescription.   Other diet: Regular    Special Instructions: Discharge instructions for the Orthopedic Patient    Follow up with Primary Care Physician within 2 weeks of discharge to home, regarding:  Review of medications and diagnostic testing.  Surveillance for medical complications.  Workup and treatment of osteoporosis, if appropriate.     -Is this a Joint Replacement patient? Yes   Total Joint Hip Replacement Discharge Instructions    Pain  - The goal is to slowly wean off the prescription pain medicine.  - Ice can be used for pain control.  20 minutes at a time is recommended, and never directly against your skin  or incision.  - Most patients are off the pain pills by 3 weeks; others may require a low level of pain medications for many months. If your pain continues to be severe, follow up with your physician.  Infection  Deep hip joint infections that require removal of the prostheses occur in less than 0.1% of patients. Lesser infections in the skin (cellulites) are more common and much more easily treated.  - Keep the incision as clean and dry as possible.  - Always wash your hands before touching your incision.  - Skin infections tend to develop around 7-10 days after surgery, most can be treated with oral antibiotics.  - Dental Care should be delayed for 3 months after surgery, your surgeon recommends taking a dose of antibiotics 1 hour prior to any dental procedure.  After 2 years, most surgeons recommend antibiotics only before an extensive procedure.  Ask your surgeon what he recommends.  - Signs and symptoms of infection can include:  low grade fever, redness, pain, swelling and drainage from your incision.  Notify your surgeon immediately if you develop any of these symptoms.  Post op Disturbances  - Bowel habits - constipation is extremely common and is caused by a combination of anesthesia, lack of mobility and pain medicine.  Use stool softeners or laxatives if necessary. It is important not to ignore this problem, as bowel obstructions can be a serious complication after joint replacement surgery.  - Mood/Energy Level - Many patients experience a lack of energy and endurance for up to 2-3 months after surgery.  Some may also feel down and can even become depressed.  This is likely due to the postoperative anemia, change in activity level, lack of sleep, pain medicine and just the emotional reaction to the surgery itself that is a big disruption in a person’s life.  This usually passes.  If symptoms persist, follow up with your primary physician.  - Returning to work - Your surgeon will give you more specific  instructions.  Generally, if you work a sedentary job requiring little standing or walking, most patients may return within 2-6 weeks.  Manual labor jobs involving walking, lifting and standing may take 3-4 months.  Your surgeon’s office can provide a release to part-time or light duty work early on in your recovery and progress you to full duty as able.  - Driving - You can begin driving an automatic shift car in 4 to 8 weeks, provided you are no longer taking narcotic pain medication. If you have a stick-shift car and your right hip was replaced, do not begin driving until your doctor says you can.   - Avoiding falls -  throw rugs and tack down loose carpeting.  Be aware of floor hazards such as pets, small objects or uneven surfaces.   -  Airport Metal Detectors - The sensitivity of metal detectors varies and it is likely that your prosthesis will cause an alarm. Inform the  that you have an artificial joint.  Diet  - Resume your normal diet as tolerated.  - It is important to achieve a healthy nutritional status by eating a well balanced diet on a regular basis.  - Your physician may recommend that you take iron and vitamin supplements.   - Continue to drink plenty of fluids.  Shower/Bathing  - You may shower as soon as you get home from the hospital unless otherwise instructed.  - Keep your incision out of water.  To keep the incision dry when showering, cover it with a plastic bag or plastic wrap.  - Pat incision dry if it gets wet.  Don’t rub.  - Do not submerge in a bath until staples are out and the incision is completely healed. (Approximately 6-8 weeks after surgery).  Dressing Change:  Procedure (if recommended by your physician)  - Wash hands.  - Open all dressing change materials.  - Remove old dressing and discard.  - Inspect incision for redness, increase in clear drainage, yellow/green drainage, odor and surrounding skin hot to touch.  -  ABD (large gauze) pad by one  corner and lay over the incision.  Be careful not to touch the inside of the dressing that will lay over the incision.  - Secure in place as instructed (Ace wrap or tape).    Swelling/Bruising  - Swelling is normal after hip replacement and can involve the thigh, knee, calf and foot.  - Swelling can last from 3-6 months.  - Elevate your leg higher than your heart while reclining.  The first week you are home you should elevate your leg an equal amount of time, as you are active.    - Anti-inflammatory pills can be taken once you have stopped the blood thinners.  - The swelling is usually worse after you go home since you are upright for longer periods of time.  - Bruising is common and can involve the entire leg including the thigh, calf and even foot.  Bruising often does not appear until after you arrive home and it can be quite dramatic- purple, black, green.  The bruising you can see is not usually concerning and will subside without any treatment.      Blood Clot Prevention  Blood clots in the legs and the less common, but frightening, clots that travel to the lungs are a real focus of our preventative. Most patients are at standard risk for them, but those patients who are at higher risk include people who have had previous clots, a family history of clotting, smoking, diabetes, obesity, advanced age, use of estrogen and a sedentary lifestyle.    - Signs of blood clots in legs - Swelling in thigh, calf or ankle that does not go down with elevation.  Pain, heat and tenderness in calf, back of calf or groin area.  NOTE: blood clots can occur in either leg.  - You have been receiving anticoagulant therapy (blood thinners) in the hospital and you may be instructed to continue at home depending on your risk factors.  - Your risk for developing a clot continues for up to 2-3 months after surgery.  You should avoid prolonged sitting and dehydration during that time (long air trips and car trips).  If you do take a  trip during this time, please get up and move around every 1- 1.5 hours.  - If you are prescribed blood thinning medication for home, follow instructions as directed. (Handouts provided if applicable).      Activity    Once you get home, you should stay active. The key is not to overdo it! While you can expect some good days and some bad days, you should notice a gradual improvement over time you should notice a gradual improvement and a gradual increase in your endurance over the next 6 to 12 months.    - Weight Bearing - If you have undergone cemented or hybrid hip replacement, you can put some weight on the leg immediately using a cane or walker, and you should continue to use some support for 4 to 6 weeks to help the muscles recover.   - Sleeping Positions - Sleep on your back with your legs slightly apart or on your side with a regular pillow between your knees. Be sure to use the pillow for at least 6 weeks, or until your doctor says you can do without it. Sleeping on your stomach should be all right  - Sitting - For at least the first 3 months, sit only in chairs that have arms. Do not sit on low chairs, low stools, or reclining chairs. Do not cross your legs at the knees. The physical therapist will show you how to sit and stand from a chair, keeping your affected leg out in front of you. Get up and move around on a regular basis--at least once every hour.  - Walking - Walk as much as you like once your doctor gives you the go-ahead, but remember that walking is no substitute for your prescribed exercises. Walking with a pair of trekking poles is helpful and adds as much as 40% to the exercise you get when you walk  - Therapy may be needed in some cases, to strengthen your muscles and improve your gait (walking pattern).  This decision will be made at your post-operative appointment.  Follow your therapist recommended post-operative exercises (handout provided by Therapist).  - Swimming is also recommended;  you can begin as soon as the sutures have been removed and the wound is healed, approximately 6 to 8 weeks after surgery. Using a pair of training fins may make swimming a more enjoyable and effective exercise.  - Other activities - Lower impact activities are preferred.  If you have specific questions, consult your Surgeon.    - Sexual activity - Your surgeon can tell you when it should be safe to resume sexual activity.      When to Call the Doctor   Call the physician if:   - Fever over 100.5? F  - Increased pain, drainage, redness, odor or heat around the incision area  - Shaking chills  - Increased knee pain with activity and rest  - Increased pain in calf, tenderness or redness above or below the knee  - Increased swelling of calf, ankle, foot  - Sudden increased shortness of breath, sudden onset of chest pain, localized chest pain with coughing  - Incision opening  Or, if there are any questions or concerns about medications or care.       -Is this patient being discharged with medication to prevent blood clots?  No    · Is patient discharged on Warfarin / Coumadin?   No     Depression / Suicide Risk    As you are discharged from this [facility] facility, it is important to learn how to keep safe from harming yourself.    Recognize the warning signs:  · Abrupt changes in personality, positive or negative- including increase in energy   · Giving away possessions  · Change in eating patterns- significant weight changes-  positive or negative  · Change in sleeping patterns- unable to sleep or sleeping all the time   · Unwillingness or inability to communicate  · Depression  · Unusual sadness, discouragement and loneliness  · Talk of wanting to die  · Neglect of personal appearance   · Rebelliousness- reckless behavior  · Withdrawal from people/activities they love  · Confusion- inability to concentrate     If you or a loved one observes any of these behaviors or has concerns about self-harm, here's what you  can do:  · Talk about it- your feelings and reasons for harming yourself  · Remove any means that you might use to hurt yourself (examples: pills, rope, extension cords, firearm)  · Get professional help from the community (Mental Health, Substance Abuse, psychological counseling)  · Do not be alone:Call your Safe Contact- someone whom you trust who will be there for you.  · Call your local CRISIS HOTLINE 911-1702 or 590-668-8018  · Call your local Children's Mobile Crisis Response Team [place] (365) 914-1124 or www.Lav[employer]oom  · Call the toll free National Suicide Prevention Hotlines   · National Suicide Prevention Lifeline 345-325-PFRZ (0153)  · National Hope Line Network 800-SUICIDE (028-6670)

## 2019-01-08 NOTE — PROGRESS NOTES
Total Joint Replacement Program Rounding     Inpatient bedside rounding attempted to address quality of care provided by total joint replacement program IDT and overall patient experience at [facility].    Pt currently working with PT and on 2nd attempt working with OT in preparation for discharge to home today. Progressing as expected per RN.    Will attempt later as able.

## 2019-01-08 NOTE — THERAPY
"Occupational Therapy Evaluation completed.   Functional Status:  Pt is a 74 y/o female, admit for a R OPHELIA. Pt lives with her , who can assist with care. PLOF- I with AMB ADLS . Pt presents with minimal c/o pain with ADLS and functional mobility, is at the Supervised to SBA level with the use of AE. Pt was educated regarding THP, AE, DME, and techniques regarding ADLS and functional mobility, post surgery. Pt will be seen for initial evaluation and treatment only, as she is functional in this setting.  Plan of Care: Patient with no further skilled OT needs in the acute care setting at this time  Discharge Recommendations:  Equipment: No Equipment Needed. Post-acute therapy Discharge to home with outpatient or home health for additional skilled therapy services.    See \"Rehab Therapy-Acute\" Patient Summary Report for complete documentation.    "

## 2019-01-08 NOTE — THERAPY
"Physical Therapy Evaluation completed.   Bed Mobility:     Transfers: Sit to Stand: Stand by Assist  Gait: Level Of Assist: Stand by Assist with Crutches x 150 feet      Plan of Care: Patient with no further skilled PT needs in the acute care setting at this time  Discharge Recommendations: Equipment: Crutches. Post-acute therapy Discharge to home with outpatient or home health for additional skilled therapy services.  73 year old female S/P R THR post.Pt lives at home with  and is usually active.Pt was safe with transfers,ambulation and stairs,she understands HEP,precautions and weight bearing status.Pt is ready and safe for home  See \"Rehab Therapy-Acute\" Patient Summary Report for complete documentation.     "

## 2019-01-09 ENCOUNTER — TELEPHONE (OUTPATIENT)
Dept: HEALTH INFORMATION MANAGEMENT | Facility: OTHER | Age: 74
End: 2019-01-09

## 2019-01-09 ENCOUNTER — PATIENT OUTREACH (OUTPATIENT)
Dept: HEALTH INFORMATION MANAGEMENT | Facility: OTHER | Age: 74
End: 2019-01-09

## 2019-01-09 NOTE — TELEPHONE ENCOUNTER
SCP post discharge med rec completed. No clinically significant medication issues noted. Patient denies any side effects, barriers to accessing medications, or trouble with adherence. Discussed treatment and prevention of constipation d/t scheduled pain medications.

## 2019-01-15 ENCOUNTER — PATIENT OUTREACH (OUTPATIENT)
Dept: HEALTH INFORMATION MANAGEMENT | Facility: OTHER | Age: 74
End: 2019-01-15

## 2019-01-19 ENCOUNTER — HOSPITAL ENCOUNTER (OUTPATIENT)
Facility: MEDICAL CENTER | Age: 74
End: 2019-01-20
Attending: EMERGENCY MEDICINE | Admitting: HOSPITALIST
Payer: MEDICARE

## 2019-01-19 PROBLEM — K92.2 ACUTE LOWER GI HEMORRHAGE: Status: ACTIVE | Noted: 2019-01-19

## 2019-01-19 LAB
ABO GROUP BLD: NORMAL
ABO GROUP BLD: NORMAL
ALBUMIN SERPL BCP-MCNC: 3.4 G/DL (ref 3.2–4.9)
ALBUMIN/GLOB SERPL: 1.2 G/DL
ALP SERPL-CCNC: 66 U/L (ref 30–99)
ALT SERPL-CCNC: 17 U/L (ref 2–50)
ANION GAP SERPL CALC-SCNC: 10 MMOL/L (ref 0–11.9)
APTT PPP: 28.2 SEC (ref 24.7–36)
AST SERPL-CCNC: 22 U/L (ref 12–45)
BASOPHILS # BLD AUTO: 0.8 % (ref 0–1.8)
BASOPHILS # BLD: 0.07 K/UL (ref 0–0.12)
BILIRUB SERPL-MCNC: 0.5 MG/DL (ref 0.1–1.5)
BLD GP AB SCN SERPL QL: NORMAL
BUN SERPL-MCNC: 17 MG/DL (ref 8–22)
CALCIUM SERPL-MCNC: 9.3 MG/DL (ref 8.4–10.2)
CHLORIDE SERPL-SCNC: 107 MMOL/L (ref 96–112)
CO2 SERPL-SCNC: 22 MMOL/L (ref 20–33)
CREAT SERPL-MCNC: 0.83 MG/DL (ref 0.5–1.4)
EOSINOPHIL # BLD AUTO: 0.49 K/UL (ref 0–0.51)
EOSINOPHIL NFR BLD: 5.5 % (ref 0–6.9)
ERYTHROCYTE [DISTWIDTH] IN BLOOD BY AUTOMATED COUNT: 48.6 FL (ref 35.9–50)
GLOBULIN SER CALC-MCNC: 2.8 G/DL (ref 1.9–3.5)
GLUCOSE SERPL-MCNC: 94 MG/DL (ref 65–99)
HCT VFR BLD AUTO: 34.7 % (ref 37–47)
HGB BLD-MCNC: 11.5 G/DL (ref 12–16)
HGB BLD-MCNC: 11.5 G/DL (ref 12–16)
IMM GRANULOCYTES # BLD AUTO: 0.03 K/UL (ref 0–0.11)
IMM GRANULOCYTES NFR BLD AUTO: 0.3 % (ref 0–0.9)
INR PPP: 0.99 (ref 0.87–1.13)
LIPASE SERPL-CCNC: 43 U/L (ref 7–58)
LYMPHOCYTES # BLD AUTO: 2.22 K/UL (ref 1–4.8)
LYMPHOCYTES NFR BLD: 25 % (ref 22–41)
MAGNESIUM SERPL-MCNC: 2.1 MG/DL (ref 1.5–2.5)
MCH RBC QN AUTO: 32.3 PG (ref 27–33)
MCHC RBC AUTO-ENTMCNC: 33.1 G/DL (ref 33.6–35)
MCV RBC AUTO: 97.5 FL (ref 81.4–97.8)
MONOCYTES # BLD AUTO: 0.78 K/UL (ref 0–0.85)
MONOCYTES NFR BLD AUTO: 8.8 % (ref 0–13.4)
NEUTROPHILS # BLD AUTO: 5.3 K/UL (ref 2–7.15)
NEUTROPHILS NFR BLD: 59.6 % (ref 44–72)
NRBC # BLD AUTO: 0 K/UL
NRBC BLD-RTO: 0 /100 WBC
PLATELET # BLD AUTO: 438 K/UL (ref 164–446)
PMV BLD AUTO: 8.9 FL (ref 9–12.9)
POTASSIUM SERPL-SCNC: 3.8 MMOL/L (ref 3.6–5.5)
PROT SERPL-MCNC: 6.2 G/DL (ref 6–8.2)
PROTHROMBIN TIME: 13 SEC (ref 12–14.6)
RBC # BLD AUTO: 3.56 M/UL (ref 4.2–5.4)
RH BLD: NORMAL
RH BLD: NORMAL
SODIUM SERPL-SCNC: 139 MMOL/L (ref 135–145)
WBC # BLD AUTO: 8.9 K/UL (ref 4.8–10.8)

## 2019-01-19 PROCEDURE — 86900 BLOOD TYPING SEROLOGIC ABO: CPT

## 2019-01-19 PROCEDURE — 86901 BLOOD TYPING SEROLOGIC RH(D): CPT

## 2019-01-19 PROCEDURE — 99220 PR INITIAL OBSERVATION CARE,LEVL III: CPT | Performed by: HOSPITALIST

## 2019-01-19 PROCEDURE — 85018 HEMOGLOBIN: CPT | Mod: XU

## 2019-01-19 PROCEDURE — 700111 HCHG RX REV CODE 636 W/ 250 OVERRIDE (IP): Performed by: EMERGENCY MEDICINE

## 2019-01-19 PROCEDURE — C9113 INJ PANTOPRAZOLE SODIUM, VIA: HCPCS | Performed by: EMERGENCY MEDICINE

## 2019-01-19 PROCEDURE — A9270 NON-COVERED ITEM OR SERVICE: HCPCS | Performed by: HOSPITALIST

## 2019-01-19 PROCEDURE — 700105 HCHG RX REV CODE 258: Performed by: HOSPITALIST

## 2019-01-19 PROCEDURE — 36415 COLL VENOUS BLD VENIPUNCTURE: CPT

## 2019-01-19 PROCEDURE — 80053 COMPREHEN METABOLIC PANEL: CPT

## 2019-01-19 PROCEDURE — 83690 ASSAY OF LIPASE: CPT

## 2019-01-19 PROCEDURE — G0378 HOSPITAL OBSERVATION PER HR: HCPCS

## 2019-01-19 PROCEDURE — 700102 HCHG RX REV CODE 250 W/ 637 OVERRIDE(OP): Performed by: HOSPITALIST

## 2019-01-19 PROCEDURE — 700111 HCHG RX REV CODE 636 W/ 250 OVERRIDE (IP): Performed by: HOSPITALIST

## 2019-01-19 PROCEDURE — 85610 PROTHROMBIN TIME: CPT

## 2019-01-19 PROCEDURE — C9113 INJ PANTOPRAZOLE SODIUM, VIA: HCPCS | Performed by: HOSPITALIST

## 2019-01-19 PROCEDURE — 83735 ASSAY OF MAGNESIUM: CPT

## 2019-01-19 PROCEDURE — 96376 TX/PRO/DX INJ SAME DRUG ADON: CPT

## 2019-01-19 PROCEDURE — 86850 RBC ANTIBODY SCREEN: CPT

## 2019-01-19 PROCEDURE — 85025 COMPLETE CBC W/AUTO DIFF WBC: CPT

## 2019-01-19 PROCEDURE — 85730 THROMBOPLASTIN TIME PARTIAL: CPT

## 2019-01-19 PROCEDURE — 96374 THER/PROPH/DIAG INJ IV PUSH: CPT

## 2019-01-19 PROCEDURE — 700105 HCHG RX REV CODE 258: Performed by: EMERGENCY MEDICINE

## 2019-01-19 PROCEDURE — 99285 EMERGENCY DEPT VISIT HI MDM: CPT

## 2019-01-19 RX ORDER — ACETAMINOPHEN 325 MG/1
650 TABLET ORAL EVERY 6 HOURS PRN
Status: DISCONTINUED | OUTPATIENT
Start: 2019-01-19 | End: 2019-01-20 | Stop reason: HOSPADM

## 2019-01-19 RX ORDER — DIPHENHYDRAMINE HCL 25 MG
25 TABLET ORAL EVERY 6 HOURS PRN
Status: DISCONTINUED | OUTPATIENT
Start: 2019-01-19 | End: 2019-01-20 | Stop reason: HOSPADM

## 2019-01-19 RX ORDER — ACETAMINOPHEN 500 MG
1000 TABLET ORAL EVERY 6 HOURS PRN
COMMUNITY

## 2019-01-19 RX ORDER — SODIUM CHLORIDE 9 MG/ML
INJECTION, SOLUTION INTRAVENOUS CONTINUOUS
Status: DISCONTINUED | OUTPATIENT
Start: 2019-01-19 | End: 2019-01-20 | Stop reason: HOSPADM

## 2019-01-19 RX ORDER — ONDANSETRON 2 MG/ML
4 INJECTION INTRAMUSCULAR; INTRAVENOUS EVERY 4 HOURS PRN
Status: DISCONTINUED | OUTPATIENT
Start: 2019-01-19 | End: 2019-01-20 | Stop reason: HOSPADM

## 2019-01-19 RX ORDER — ONDANSETRON 4 MG/1
4 TABLET, ORALLY DISINTEGRATING ORAL EVERY 4 HOURS PRN
Status: DISCONTINUED | OUTPATIENT
Start: 2019-01-19 | End: 2019-01-20 | Stop reason: HOSPADM

## 2019-01-19 RX ADMIN — ACETAMINOPHEN 650 MG: 325 TABLET, FILM COATED ORAL at 23:32

## 2019-01-19 RX ADMIN — SODIUM CHLORIDE 8 MG/HR: 9 INJECTION, SOLUTION INTRAVENOUS at 17:19

## 2019-01-19 RX ADMIN — SODIUM CHLORIDE 80 MG: 9 INJECTION, SOLUTION INTRAVENOUS at 14:18

## 2019-01-19 RX ADMIN — ACETAMINOPHEN 650 MG: 325 TABLET, FILM COATED ORAL at 17:19

## 2019-01-19 RX ADMIN — SODIUM CHLORIDE: 9 INJECTION, SOLUTION INTRAVENOUS at 16:02

## 2019-01-19 RX ADMIN — DIPHENHYDRAMINE HCL 25 MG: 25 TABLET ORAL at 22:56

## 2019-01-19 ASSESSMENT — ENCOUNTER SYMPTOMS
DEPRESSION: 0
PHOTOPHOBIA: 0
MYALGIAS: 0
CONSTIPATION: 0
HEADACHES: 0
SPUTUM PRODUCTION: 0
DOUBLE VISION: 0
ORTHOPNEA: 0
BLURRED VISION: 0
HEARTBURN: 0
CLAUDICATION: 0
FEVER: 0
BLOOD IN STOOL: 1
TINGLING: 0
SORE THROAT: 0
CHILLS: 0
VOMITING: 0
EYE PAIN: 0
TREMORS: 0
BACK PAIN: 0
HEMOPTYSIS: 0
NECK PAIN: 0
PND: 0
NAUSEA: 0
NERVOUS/ANXIOUS: 0
COUGH: 0
SENSORY CHANGE: 0
STRIDOR: 0
SPEECH CHANGE: 0
SHORTNESS OF BREATH: 0
PALPITATIONS: 0
MEMORY LOSS: 0
DIZZINESS: 0
WEAKNESS: 0

## 2019-01-19 ASSESSMENT — COGNITIVE AND FUNCTIONAL STATUS - GENERAL
CLIMB 3 TO 5 STEPS WITH RAILING: A LITTLE
DAILY ACTIVITIY SCORE: 24
WALKING IN HOSPITAL ROOM: A LITTLE
SUGGESTED CMS G CODE MODIFIER DAILY ACTIVITY: CH
SUGGESTED CMS G CODE MODIFIER MOBILITY: CJ
STANDING UP FROM CHAIR USING ARMS: A LITTLE
MOBILITY SCORE: 21

## 2019-01-19 ASSESSMENT — PATIENT HEALTH QUESTIONNAIRE - PHQ9
1. LITTLE INTEREST OR PLEASURE IN DOING THINGS: NOT AT ALL
2. FEELING DOWN, DEPRESSED, IRRITABLE, OR HOPELESS: NOT AT ALL
SUM OF ALL RESPONSES TO PHQ9 QUESTIONS 1 AND 2: 0

## 2019-01-19 ASSESSMENT — PAIN SCALES - GENERAL
PAINLEVEL_OUTOF10: 3
PAINLEVEL_OUTOF10: 3

## 2019-01-19 NOTE — ED TRIAGE NOTES
Chief Complaint   Patient presents with   • Rectal Bleeding     from too much asa from her hip surgery 10 days ago, co dark red tarry stools onset 3 days ago

## 2019-01-19 NOTE — PROGRESS NOTES
Pt is AAO x4.  Pt reports a 4/10 R hip pain level.  Requests a tylenol at 1700.  R hip steri strips in place, CDI. Ice pack placed.  VS WNL.  R PIV patent, running fluids.  SCDs placed.  Pt oriented room.  Family at bedside.   POC discussed.  All needs met at this time.  Bed in low position.  Call light within reach.  Rounding in place.

## 2019-01-20 ENCOUNTER — PATIENT OUTREACH (OUTPATIENT)
Dept: HEALTH INFORMATION MANAGEMENT | Facility: OTHER | Age: 74
End: 2019-01-20

## 2019-01-20 VITALS
OXYGEN SATURATION: 93 % | TEMPERATURE: 98.4 F | WEIGHT: 162.04 LBS | HEIGHT: 61 IN | BODY MASS INDEX: 30.59 KG/M2 | HEART RATE: 86 BPM | SYSTOLIC BLOOD PRESSURE: 116 MMHG | RESPIRATION RATE: 16 BRPM | DIASTOLIC BLOOD PRESSURE: 74 MMHG

## 2019-01-20 LAB
ALBUMIN SERPL BCP-MCNC: 3.4 G/DL (ref 3.2–4.9)
ALBUMIN/GLOB SERPL: 1.2 G/DL
ALP SERPL-CCNC: 70 U/L (ref 30–99)
ALT SERPL-CCNC: 22 U/L (ref 2–50)
ANION GAP SERPL CALC-SCNC: 8 MMOL/L (ref 0–11.9)
AST SERPL-CCNC: 26 U/L (ref 12–45)
BASOPHILS # BLD AUTO: 1 % (ref 0–1.8)
BASOPHILS # BLD: 0.07 K/UL (ref 0–0.12)
BILIRUB SERPL-MCNC: 0.7 MG/DL (ref 0.1–1.5)
BUN SERPL-MCNC: 10 MG/DL (ref 8–22)
CALCIUM SERPL-MCNC: 9.2 MG/DL (ref 8.4–10.2)
CHLORIDE SERPL-SCNC: 106 MMOL/L (ref 96–112)
CO2 SERPL-SCNC: 25 MMOL/L (ref 20–33)
CREAT SERPL-MCNC: 0.78 MG/DL (ref 0.5–1.4)
EOSINOPHIL # BLD AUTO: 0.5 K/UL (ref 0–0.51)
EOSINOPHIL NFR BLD: 7 % (ref 0–6.9)
ERYTHROCYTE [DISTWIDTH] IN BLOOD BY AUTOMATED COUNT: 49.1 FL (ref 35.9–50)
GLOBULIN SER CALC-MCNC: 2.8 G/DL (ref 1.9–3.5)
GLUCOSE SERPL-MCNC: 96 MG/DL (ref 65–99)
HCT VFR BLD AUTO: 39.3 % (ref 37–47)
HGB BLD-MCNC: 12.7 G/DL (ref 12–16)
IMM GRANULOCYTES # BLD AUTO: 0.03 K/UL (ref 0–0.11)
IMM GRANULOCYTES NFR BLD AUTO: 0.4 % (ref 0–0.9)
LYMPHOCYTES # BLD AUTO: 1.94 K/UL (ref 1–4.8)
LYMPHOCYTES NFR BLD: 27.2 % (ref 22–41)
MCH RBC QN AUTO: 31.8 PG (ref 27–33)
MCHC RBC AUTO-ENTMCNC: 32.3 G/DL (ref 33.6–35)
MCV RBC AUTO: 98.5 FL (ref 81.4–97.8)
MONOCYTES # BLD AUTO: 0.63 K/UL (ref 0–0.85)
MONOCYTES NFR BLD AUTO: 8.8 % (ref 0–13.4)
NEUTROPHILS # BLD AUTO: 3.97 K/UL (ref 2–7.15)
NEUTROPHILS NFR BLD: 55.6 % (ref 44–72)
NRBC # BLD AUTO: 0 K/UL
NRBC BLD-RTO: 0 /100 WBC
PLATELET # BLD AUTO: 464 K/UL (ref 164–446)
PMV BLD AUTO: 8.7 FL (ref 9–12.9)
POTASSIUM SERPL-SCNC: 4.2 MMOL/L (ref 3.6–5.5)
PROT SERPL-MCNC: 6.2 G/DL (ref 6–8.2)
RBC # BLD AUTO: 3.99 M/UL (ref 4.2–5.4)
SODIUM SERPL-SCNC: 139 MMOL/L (ref 135–145)
WBC # BLD AUTO: 7.1 K/UL (ref 4.8–10.8)

## 2019-01-20 PROCEDURE — 700111 HCHG RX REV CODE 636 W/ 250 OVERRIDE (IP): Performed by: HOSPITALIST

## 2019-01-20 PROCEDURE — 85025 COMPLETE CBC W/AUTO DIFF WBC: CPT

## 2019-01-20 PROCEDURE — 160048 HCHG OR STATISTICAL LEVEL 1-5: Performed by: INTERNAL MEDICINE

## 2019-01-20 PROCEDURE — 96376 TX/PRO/DX INJ SAME DRUG ADON: CPT | Mod: XU

## 2019-01-20 PROCEDURE — 160035 HCHG PACU - 1ST 60 MINS PHASE I: Performed by: INTERNAL MEDICINE

## 2019-01-20 PROCEDURE — 160009 HCHG ANES TIME/MIN: Performed by: INTERNAL MEDICINE

## 2019-01-20 PROCEDURE — 160203 HCHG ENDO MINUTES - 1ST 30 MINS LEVEL 4: Performed by: INTERNAL MEDICINE

## 2019-01-20 PROCEDURE — C9113 INJ PANTOPRAZOLE SODIUM, VIA: HCPCS | Performed by: HOSPITALIST

## 2019-01-20 PROCEDURE — 160002 HCHG RECOVERY MINUTES (STAT): Performed by: INTERNAL MEDICINE

## 2019-01-20 PROCEDURE — 700101 HCHG RX REV CODE 250

## 2019-01-20 PROCEDURE — 700111 HCHG RX REV CODE 636 W/ 250 OVERRIDE (IP)

## 2019-01-20 PROCEDURE — G0378 HOSPITAL OBSERVATION PER HR: HCPCS

## 2019-01-20 PROCEDURE — 99217 PR OBSERVATION CARE DISCHARGE: CPT | Performed by: INTERNAL MEDICINE

## 2019-01-20 PROCEDURE — 700105 HCHG RX REV CODE 258: Performed by: HOSPITALIST

## 2019-01-20 PROCEDURE — 36415 COLL VENOUS BLD VENIPUNCTURE: CPT

## 2019-01-20 PROCEDURE — 80053 COMPREHEN METABOLIC PANEL: CPT

## 2019-01-20 RX ORDER — DIPHENHYDRAMINE HYDROCHLORIDE 50 MG/ML
12.5 INJECTION INTRAMUSCULAR; INTRAVENOUS
Status: DISCONTINUED | OUTPATIENT
Start: 2019-01-20 | End: 2019-01-20 | Stop reason: HOSPADM

## 2019-01-20 RX ORDER — ONDANSETRON 2 MG/ML
4 INJECTION INTRAMUSCULAR; INTRAVENOUS
Status: DISCONTINUED | OUTPATIENT
Start: 2019-01-20 | End: 2019-01-20 | Stop reason: HOSPADM

## 2019-01-20 RX ORDER — OMEPRAZOLE 20 MG/1
20 CAPSULE, DELAYED RELEASE ORAL 2 TIMES DAILY
Qty: 60 CAP | Refills: 1 | Status: SHIPPED | OUTPATIENT
Start: 2019-01-20

## 2019-01-20 RX ORDER — SODIUM CHLORIDE 9 MG/ML
INJECTION, SOLUTION INTRAVENOUS CONTINUOUS
Status: DISCONTINUED | OUTPATIENT
Start: 2019-01-20 | End: 2019-01-20 | Stop reason: HOSPADM

## 2019-01-20 RX ORDER — HALOPERIDOL 5 MG/ML
1 INJECTION INTRAMUSCULAR
Status: DISCONTINUED | OUTPATIENT
Start: 2019-01-20 | End: 2019-01-20 | Stop reason: HOSPADM

## 2019-01-20 RX ADMIN — SODIUM CHLORIDE 8 MG/HR: 9 INJECTION, SOLUTION INTRAVENOUS at 04:31

## 2019-01-20 ASSESSMENT — PAIN SCALES - GENERAL
PAINLEVEL_OUTOF10: 0
PAINLEVEL_OUTOF10: 3

## 2019-01-20 NOTE — PROGRESS NOTES
Tele strip at 1937 shows SR w/ HR of 74    SD 0.14  QRS 0.08  QT 0.40     Telemetry Summary    Rhythm NSR  Rate 70s - 90s  Ectopy Frequent PVCs, rare trigeminy and bigeminy, per [facility]    Telemetry monitoring strips faxed to primary RN. Tele strip summary only. Further assessment and monitoring to be done by primary RN.

## 2019-01-20 NOTE — ASSESSMENT & PLAN NOTE
Suspect secondary to upper GI bleeding,   Patient was taking aspirin, ibuprofen post surgery of her foot.  Protonix gtt started  Hemoglobin and hematocrit every 8 hours, use of hemoglobin less than 7 g/dL  Gastroenterology consulted awaiting for recommendations, Digestive health

## 2019-01-20 NOTE — CARE PLAN
Problem: Safety  Goal: Will remain free from falls  Outcome: PROGRESSING AS EXPECTED    Intervention: Implement fall precautions  Patient educated and understands the fall precautions in place to prevent falls.  Bed alarm is off, patient calls appropriately, IV pole closest to bathroom, treaded slipper socks on, and bedrails closest to bathroom down.  Patient also educated and understands the use of the call button for any assistance with mobility.      Problem: Infection  Goal: Will remain free from infection  Outcome: PROGRESSING AS EXPECTED    Intervention: Implement standard precautions and perform hand washing before and after patient contact  Patient educated and understands the importance of proper hand hygiene for herself, her visitors, and her care team.

## 2019-01-20 NOTE — DISCHARGE INSTRUCTIONS
Discharge Instructions    Discharged to home by car with relative. Discharged via wheelchair, hospital escort: Yes.  Special equipment needed: Not Applicable    Be sure to schedule a follow-up appointment with your primary care doctor or any specialists as instructed.     Discharge Plan:   Diet Plan: Discussed  Activity Level: Discussed  Confirmed Follow up Appointment: Patient to Call and Schedule Appointment  Confirmed Symptoms Management: Discussed  Medication Reconciliation Updated: Yes  Pneumococcal Vaccine Administered/Refused: Not given - Patient refused pneumococcal vaccine  Influenza Vaccine Indication: Patient Refuses    I understand that a diet low in cholesterol, fat, and sodium is recommended for good health. Unless I have been given specific instructions below for another diet, I accept this instruction as my diet prescription.   Other diet: Regular    Special Instructions: None    · Is patient discharged on Warfarin / Coumadin?   No           Depression / Suicide Risk    As [facility] are [facility], it is important to learn how to keep safe from harming yourself.    Recognize the warning signs:  · Abrupt changes in personality, positive or negative- including increase in energy   · Giving away possessions  · Change in eating patterns- significant weight changes-  positive or negative  · Change in sleeping patterns- unable to sleep or sleeping all the time   · Unwillingness or inability to communicate  · Depression  · Unusual sadness, discouragement and loneliness  · Talk of wanting to die  · Neglect of personal appearance   · Rebelliousness- reckless behavior  · Withdrawal from people/activities they love  · Confusion- inability to concentrate     If you or a loved one observes any of these behaviors or has concerns about self-harm, here's what you can do:  · Talk about it- your feelings and reasons for harming yourself  · Remove any means that you might use to hurt yourself  (examples: pills, rope, extension cords, firearm)  · Get professional help from the community (Mental Health, Substance Abuse, psychological counseling)  · Do not be alone:Call your Safe Contact- someone whom you trust who will be there for you.  · Call your local CRISIS HOTLINE 006-0660 or 118-497-1403  · Call your local Children's Mobile Crisis Response Team [place] (138) 933-1409 or www.Mailbox  · Call the toll free National Suicide Prevention Hotlines   · National Suicide Prevention Lifeline 251-012-QZJH (1878)  · National Hope Line Network 800-SUICIDE (465-3412)

## 2019-01-20 NOTE — OP REPORT
DATE OF SERVICE:  01/20/2019    PHYSICIAN:  Ad Riley MD    MEDICATIONS:  Deep sedation, propofol.    ANESTHESIOLOGIST:  Сергей Christopher MD    PREPROCEDURE DIAGNOSIS:  Gastrointestinal bleed.    POSTPROCEDURE DIAGNOSES:  1.  Byron's erosions and hiatal hernia.  2.  Antral ulceration, low risk likely secondary to NSAID-induced gastropathy.    PHYSICIAN:  Osvaldo Myers MD    CONSENT:  Procedure risks and benefits reviewed thoroughly with the patient,   risks including but not limited to bleeding, perforation, side effects of   medication were informed.  Patient voiced understanding and agreed to proceed.    DESCRIPTION OF PROCEDURE:  Patient was placed in a left lateral decubitus   position after sedation was achieved.  The bite block was inserted in the   mouth and a forward viewing gastroscope was passed carefully and easily under   direct visualization into the esophagus.  All esophageal views were normal.    Examination to the stomach revealed a large 7-10 cm hiatal hernia.  At the   distal portion of the hernia, there were several Byron's erosions,   nonbleeding.  Retroflex views of the hiatal hernia again confirmed erosions   without any other abnormalities.  The stomach was devoid of any active blood,   active bleeding or old or new blood.  Examination of the body was normal.    Antral portion of the stomach revealed a 5 mm healing ulcer without stigmata   for high risk bleed.  There was no overlying clot, visible vessel or active   bleeding.  The pyloric valve, duodenal bulb, duodenal sweep, second and third   portions of the duodenum was otherwise normal.  The stomach was suctioned,   desufflated.  The scope was removed.    COMPLICATIONS:  None.    BLOOD LOSS:  None.    SPECIMENS:  None.    RECOMMENDATIONS:  1.  NSAID-induced gastropathy as the etiology of the patient's melena.    Patient's bleeding has now abated.  Patient may be transitioned to proton pump   inhibitor therapy in oral fashion,  omeprazole 40 mg p.o. q. 12 hours or   equivalent for the next 30 days.  2.  This is the patient's second NSAID-induced GI bleed and patient should be   advised to avoid NSAIDs in the future and she is also prescribed a proton pump   inhibitor.  Patient will be started on regular diet and from a GI   perspective, may be discharged today.       ____________________________________     [facility] MD MEET Berman / DUY    DD:  01/20/2019 09:27:17  DT:  01/20/2019 09:42:35    D#:  [id number]  Job#:  [id number]

## 2019-01-20 NOTE — OR NURSING
0924- Patient arrived from [facility]. Drowsy. Respirations even and unlabored. VSS, see flowsheets. Abdomen soft and nontender. Normoactive bowel sounds x4.   0932- Discussion with Dr. Riley regarding protonix infusion. Per MD restart gtt and finish bag.   0953- VSS, see flowsheets. Patient continues to deny any pain or nausea. Report to ROHIT Romo.

## 2019-01-20 NOTE — H&P
Hospital Medicine History & Physical Note    Date of Service  1/19/2019    Primary Care Physician  Gunjan Tavarez P.A.-C.    Consultants  Dr.Nourani MARTÍNEZ     Code Status  Full code    Chief Complaint  Rectal bleeding    History of Presenting Illness  74 y.o. female with no significant past medical history, except for her recently undergoing a right total hip arthroplasty in addition to a gluteal tendon repair of her right hip on 1/7/2019 , thus postoperatively patient was instructed to take aspirin 325 mg daily, with Tylenol for pain control, however patient was also using ibuprofen for headaches, thus patient presented 1/19/2019 to the ER for evaluation of rectal bleeding, patient reported 3 days ago she started to have dark red/ dark tarry stools, for which has been ongoing. Hence patient reported to the ER for further evaluation.  Upon initial labs patient was noted to have a hemoglobin of 11.5 which is a drop from 14.7 noted back in end of December 2018.  Patient nevertheless denies having any chest pain shortness of breath, denies having any abdominal pain.    At this point patient will be admitted, closely monitored with q. 8-hour hemoglobin checks, started on Protonix IV, GI has been consulted.    Review of Systems  Review of Systems   Constitutional: Negative for chills, fever and malaise/fatigue.   HENT: Negative for congestion, hearing loss, sore throat and tinnitus.    Eyes: Negative for blurred vision, double vision, photophobia and pain.   Respiratory: Negative for cough, hemoptysis, sputum production, shortness of breath and stridor.    Cardiovascular: Negative for chest pain, palpitations, orthopnea, claudication and PND.   Gastrointestinal: Positive for blood in stool and melena. Negative for constipation, heartburn, nausea and vomiting.   Genitourinary: Negative for dysuria, frequency and urgency.   Musculoskeletal: Negative for back pain, myalgias and neck pain.   Neurological: Negative for  dizziness, tingling, tremors, sensory change, speech change, weakness and headaches.   Psychiatric/Behavioral: Negative for depression, memory loss and suicidal ideas. The patient is not nervous/anxious.        Past Medical History   has a past medical history of Arthritis; Hiatus hernia syndrome; Renal disorder (1990's); Right hip pain (12/24/2018); and Seasonal allergies.    Surgical History   has a past surgical history that includes cholecystectomy (2003); appendectomy (2006); hip arthroplasty total (Left, 2005); mammoplasty augmentation (1988); hiatal hernia repair (2002); tonsillectomy (as child); colonoscopy (2015); dental restoration (as child); lithotripsy (1990's); and hip arthroplasty total (Right, 1/7/2019).     Family History  family history is not on file.     Social History   reports that she quit smoking about 22 years ago. Her smoking use included Cigarettes. She has a 45.00 pack-year smoking history. She has never used smokeless tobacco. She reports that she drinks alcohol. She reports that she does not use drugs.    Allergies  Allergies   Allergen Reactions   • Seasonal    • Lactose        Medications  Prior to Admission Medications   Prescriptions Last Dose Informant Patient Reported? Taking?   Pseudoephedrine HCl (SUPHEDRIN PO) 1/19/2019 at AM Patient Yes No   Sig: Take 10 mg by mouth 2 times a day as needed.   acetaminophen (TYLENOL) 500 MG Tab 1/19/2019 at AM Patient Yes Yes   Sig: Take 1,000 mg by mouth every 6 hours as needed for Mild Pain.   diphenhydrAMINE (BENADRYL) 25 MG Tab 1/19/2019 at AM Patient Yes No   Sig: Take 25 mg by mouth every 6 hours as needed.   sodium chloride (OCEAN) 0.65 % Solution 1/19/2019 at AM Patient Yes No   Sig: Spray 2 Sprays in nose as needed.      Facility-Administered Medications: None       Physical Exam  Temp:  [36.6 °C (97.8 °F)-36.9 °C (98.5 °F)] 36.6 °C (97.8 °F)  Pulse:  [70-93] 70  Resp:  [9-21] 18  BP: (121-129)/(78-92) 121/78  SpO2:  [95 %-98 %] 98  %    Physical Exam   Constitutional: She is oriented to person, place, and time. She appears well-developed and well-nourished. No distress.   HENT:   Head: Normocephalic and atraumatic.   Mouth/Throat: No oropharyngeal exudate.   Eyes: Pupils are equal, round, and reactive to light. Conjunctivae are normal. Right eye exhibits no discharge. No scleral icterus.   Neck: Neck supple. No JVD present. No thyromegaly present.   Cardiovascular: Normal rate and intact distal pulses.    No murmur heard.  Pulmonary/Chest: Effort normal and breath sounds normal. No stridor. No respiratory distress. She has no wheezes. She has no rales.   Abdominal: Soft. Bowel sounds are normal. She exhibits no distension. There is no tenderness. There is no rebound.   Musculoskeletal: Normal range of motion. She exhibits no edema.   Neurological: She is alert and oriented to person, place, and time. No cranial nerve deficit.   Skin: Skin is warm. She is not diaphoretic. No erythema.   Psychiatric: She has a normal mood and affect. Her behavior is normal. Thought content normal.   Nursing note and vitals reviewed.      Laboratory:  Recent Labs      01/19/19   1413   WBC  8.9   RBC  3.56*   HEMOGLOBIN  11.5*   HEMATOCRIT  34.7*   MCV  97.5   MCH  32.3   MCHC  33.1*   RDW  48.6   PLATELETCT  438   MPV  8.9*     Recent Labs      01/19/19   1413   SODIUM  139   POTASSIUM  3.8   CHLORIDE  107   CO2  22   GLUCOSE  94   BUN  17   CREATININE  0.83   CALCIUM  9.3     Recent Labs      01/19/19   1413   ALTSGPT  17   ASTSGOT  22   ALKPHOSPHAT  66   TBILIRUBIN  0.5   LIPASE  43   GLUCOSE  94     Recent Labs      01/19/19   1413   APTT  28.2   INR  0.99             No results for input(s): TROPONINI in the last 72 hours.    Urinalysis:    No results found     Imaging:  No orders to display         Assessment/Plan:  I anticipate this patient will require at least two midnights for appropriate medical management, necessitating inpatient admission.    Acute  lower GI hemorrhage   Assessment & Plan    Suspect secondary to upper GI bleeding,   Patient was taking aspirin, ibuprofen post surgery of her foot.  Protonix gtt started  Hemoglobin and hematocrit every 8 hours, use of hemoglobin less than 7 g/dL  Gastroenterology consulted awaiting for recommendations, [facility]         VTE prophylaxis: SCDs

## 2019-01-20 NOTE — CARE PLAN
Problem: Knowledge Deficit  Goal: Knowledge of disease process/condition, treatment plan, diagnostic tests, and medications will improve  POC discussed. Pt understands the plan is monitor hgb and for further bleed.. All questions and concerns addressed.    Problem: Pain Management  Goal: Pain level will decrease to patient's comfort goal  Will manage pain with tylenol/ice pack.

## 2019-01-20 NOTE — PROGRESS NOTES
Telemetry Shift Summary    Rhythm SR  HR Range 70's  Ectopy .16  Measurements .16/.08/.40        Normal Values  Rhythm SR  HR Range    Measurements 0.12-0.20 / 0.06-0.10  / 0.30-0.52

## 2019-01-20 NOTE — CARE PLAN
Problem: Knowledge Deficit  Goal: Knowledge of disease process/condition, treatment plan, diagnostic tests, and medications will improve  Outcome: PROGRESSING AS EXPECTED  Pt remains NPO at midnight for EGD at 0900 in AM       Problem: Pain Management  Goal: Pain level will decrease to patient's comfort goal  Outcome: PROGRESSING AS EXPECTED   01/19/19 2760   OTHER   Pain Scale 0 - 10  3   Comfort Goal Comfort at Rest;Comfort with Movement;Sleep Comfortably   Pt c/o pain to previous R hip surgery; given Tylenol 650mg per MAR   Pt sleeping comfortably in bed at this moment - will continue to monitor for pain and medicate as needed

## 2019-01-20 NOTE — ED PROVIDER NOTES
ED Provider Note    CHIEF COMPLAINT  Chief Complaint   Patient presents with   • Rectal Bleeding     from too much asa from her hip surgery 10 days ago, co dark red tarry stools onset 3 days ago       HPI  Ping Kaur is a 74 y.o. female who presents with complaints of black tarry stools for the past 3 days.  The patient underwent a right total hip arthroplasty 10 days ago with Dr. Miranda of orthopedics.  She has been taking 325 mg aspirin twice a day for prevention of a blood clot.  She started having some black tarry stools 3 days ago, stopped taking aspirin yesterday.  Today she continued to have some bloody stools, with some slight weakness and lightheadedness and dizziness.  She has had no nausea, or vomiting.  The patient says she had a similar episode occurred about 10 years ago when she took too much ibuprofen.  She has had a previous endoscopy and colonoscopy with [facility].  The patient denies any chest pain, back pain, or abdominal pain.  She has had no fever, chills, sore throat, cough, congestion, or any difficulty breathing.  She says she was seen in the office yesterday and told that the hip was doing well.  She has not noticed any increased pain, redness, swelling to the right leg.  She has had no new numbness or tingling to the extremities.    REVIEW OF SYSTEMS  See HPI for further details. All other systems are negative.     PAST MEDICAL HISTORY  Past Medical History:   Diagnosis Date   • Arthritis    • Hiatus hernia syndrome     Had wrap done to repair, but states it came undone.   • Renal disorder 1990's    cysto/lithotripsy for renal stones.   • Right hip pain 12/24/2018    Intermittent   • Seasonal allergies        FAMILY HISTORY  No family history on file.    SOCIAL HISTORY  Social History     Social History   • Marital status:      Spouse name: N/A   • Number of children: N/A   • Years of education: N/A     Social History Main Topics   • Smoking status: Former  "Smoker     Packs/day: 1.50     Years: 30.00     Types: Cigarettes     Quit date: 1997   • Smokeless tobacco: Never Used   • Alcohol use Yes      Comment: 3 per week   • Drug use: No      Comment: denies h/o heroin, cocaine, meth, IVDU   • Sexual activity: Yes     Partners: Male      Comment:      Other Topics Concern   • Not on file     Social History Narrative   • No narrative on file       SURGICAL HISTORY  Past Surgical History:   Procedure Laterality Date   • HIP ARTHROPLASTY TOTAL Right 1/7/2019    Procedure: HIP ARTHROPLASTY TOTAL, GLUTEAL TENDON REPAIR;  Surgeon: Malachi Miranda M.D.;  Location: SURGERY [facility];  Service: Orthopedics   • COLONOSCOPY  2015   • APPENDECTOMY  2006   • HIP ARTHROPLASTY TOTAL Left 2005   • CHOLECYSTECTOMY  2003   • HIATAL HERNIA REPAIR  2002   • MAMMOPLASTY AUGMENTATION  1988    with cyst excision-negative.   • DENTAL RESTORATION  as child    Implant front upper   • LITHOTRIPSY  1990's   • TONSILLECTOMY  as child       CURRENT MEDICATIONS  Home Medications     Reviewed by Ella Campos R.N. (Registered Nurse) on 01/19/19 at 1608  Med List Status: Complete   Medication Last Dose Status   acetaminophen (TYLENOL) 500 MG Tab 1/19/2019 Active   diphenhydrAMINE (BENADRYL) 25 MG Tab 1/19/2019 Active   Pseudoephedrine HCl (SUPHEDRIN PO) 1/19/2019 Active   sodium chloride (OCEAN) 0.65 % Solution 1/19/2019 Active                ALLERGIES  Allergies   Allergen Reactions   • Seasonal    • Lactose        PHYSICAL EXAM  VITAL SIGNS: /78   Pulse 70   Temp 36.6 °C (97.8 °F) (Oral)   Resp 18   Ht 1.549 m (5' 1\")   Wt 73.5 kg (162 lb 0.6 oz)   SpO2 98%   Breastfeeding? No   BMI 30.62 kg/m²   Constitutional: Awake, alert, in no acute distress, Non-toxic appearance.   HENT: Atraumatic. Bilateral external ears normal, mucous membranes moist, throat nonerythematous without exudates, nose is normal.  Eyes: PERRL, EOMI, conjunctiva moist, noninjected.  Neck: " Nontender, Normal range of motion, No nuchal rigidity, No stridor.   Lymphatic: No lymphadenopathy noted.   Cardiovascular: Regular rate and rhythm, no murmurs, rubs, gallops.  Thorax & Lungs:  Good breath sounds bilaterally, no wheezes, rales, or retractions.  No chest tenderness.  Abdomen: Bowel sounds normal, Soft, nontender, nondistended, no rebound, guarding, masses.  Back: No CVA or spinal tenderness.  Extremities: Intact distal pulses, No edema, No tenderness.  There is a healing incision over the right lateral hip.  Steri-Strips are in place.  There is some very slight erythema around the wound which appears to be normal wound healing.  There is no fluctuance or drainage.  Skin: Warm, Dry, No rashes.  Rectal: Performed by Dr. Grajeda reportedly showed dark stool, no jamila melena, guaiac positive.  Musculoskeletal: No joint swelling or tenderness.  Neurologic: Alert & oriented x 3, sensory and motor function normal. No focal deficits.   Psychiatric: Affect normal, Judgment normal, Mood normal.     EKG          RADIOLOGY/PROCEDURES  No orders to display         COURSE & MEDICAL DECISION MAKING  Pertinent Labs & Imaging studies reviewed. (See chart for details)  The patient presents with the above complaints.  Given her symptoms, concern is for possible upper GI bleed.  She is hemodynamically stable with a normal blood pressure and pulse.  She denies having any pain, declined any pain medication.  CBC showed a white count of 8900, hemoglobin 11.5, normal differential, chemistry shows a CO2 22, BUN 17, creatinine 0.83, glucose 94, liver function test normal, lipase normal.  The patient had a hemoglobin of 14.7 on December 24, 2018, less than a month ago.  Her hemoglobin has dropped to 11.5.  She was given a bolus of Protonix, will be started on a continuous infusion.  Dr. Riley of gastroneurology was consulted and will see the patient in the morning.  He has asked her to be continued on a Protonix infusion,  n.p.o. after midnight.  Case was discussed with Dr. Palafox for admission.    FINAL IMPRESSION  1.  Upper GI bleed  2.   3.         Electronically signed by: Martin Zuluaga, 1/19/2019 7:13 PM

## 2019-01-20 NOTE — PROGRESS NOTES
Bedside report completed w/ Thu/RN.  Assumed pt care. Patient in in bedside chair, in no apparent distress.  Safety precautions in place. Call light & personal belongings within reach.  Plan of care discussed.  Patient is on room air, IV running NS @ 100 mL/hour and protonix @ 25 mL/hour.  Reports mild back pain, given heat pack per request.  Has been NPO since midnight in anticipation of EGD at 0900 this AM.  No other needs at this time, will continue to monitor.

## 2019-01-20 NOTE — PROGRESS NOTES
Received report from day shift nurse.   Assumed pt care   Pt is A&Ox4, resting comfortably in bed.   Pt on r.a.. No signs of SOB/respiratory distress.  Labs noted, VSS.   Pt c/o pain to previous surgical site/ R hip; given Tylenol 650mg per MAR  Assessment completed, steri-strips to R hip in place; CDI, - numbness/tingling.   Fall precautions in place.   Call light and personal belongings within reach.   Continue to monitor

## 2019-01-21 NOTE — DISCHARGE SUMMARY
Discharge Summary    CHIEF COMPLAINT ON ADMISSION  Chief Complaint   Patient presents with   • Rectal Bleeding     from too much asa from her hip surgery 10 days ago, co dark red tarry stools onset 3 days ago       Reason for Admission  Rectal Bleed     Admission Date  1/19/2019    CODE STATUS  Prior    HPI & HOSPITAL COURSE  This is a 74 y.o. female here with episodes of rectal bleeding after taking a total of 650 mg of aspirin daily for the last couple of weeks following right total hip replacement.  She has a prior history of GI bleed, following a previous surgery after which she was taking NSAIDs/ ASA.  EGD was performed showing Byron erosions consistent with her aspirin use as well as antral ulceration.  There was no active bleeding and her hemoglobin remained stable.  She was anxious for discharge to home.    We discussed no further use of aspirin or anti-inflammatories for pain-patient felt her pain could be managed with Tylenol.       Therefore, she is discharged in good and stable condition to home with close outpatient follow-up.    Discharge Date  1/20/2019    FOLLOW UP ITEMS POST DISCHARGE  Primary care  Orthopedics    DISCHARGE DIAGNOSES  Upper GI bleed secondary to Byron erosions and antral ulcer    FOLLOW UP  No future appointments.  Gunjan Tavarez P.A.-C.  [address]  [place] [address]  [phone]      Please call to schedule your follow up appointment, office is closed over the weekend thank you       MEDICATIONS ON DISCHARGE     Medication List      START taking these medications      Instructions   omeprazole 20 MG delayed-release capsule  Commonly known as:  PRILOSEC   Take 1 Cap by mouth 2 times a day.  Dose:  20 mg        CONTINUE taking these medications      Instructions   acetaminophen 500 MG Tabs  Commonly known as:  TYLENOL   Take 1,000 mg by mouth every 6 hours as needed for Mild Pain.  Dose:  1000 mg     diphenhydrAMINE 25 MG Tabs  Commonly known as:  BENADRYL    Take 25 mg by mouth every 6 hours as needed.  Dose:  25 mg     sodium chloride 0.65 % Soln  Commonly known as:  OCEAN   Spray 2 Sprays in nose as needed.  Dose:  2 Spray     SUPHEDRIN PO   Take 10 mg by mouth 2 times a day as needed.  Dose:  10 mg            Allergies  Allergies   Allergen Reactions   • Seasonal    • Vicodin [Hydrocodone-Acetaminophen]    • Lactose        DIET  Regular    ACTIVITY  As tolerated-per hip replacement restriction    CONSULTATIONS  [facility]    PROCEDURES  DATE OF SERVICE:  01/20/2019     PHYSICIAN:  Ad Riley MD     MEDICATIONS:  Deep sedation, propofol.     ANESTHESIOLOGIST:  Сергей Christopher MD     PREPROCEDURE DIAGNOSIS:  Gastrointestinal bleed.     POSTPROCEDURE DIAGNOSES:  1.  Byron's erosions and hiatal hernia.  2.  Antral ulceration, low risk likely secondary to NSAID-induced gastropathy.     PHYSICIAN:  Osvaldo Myers MD     CONSENT:  Procedure risks and benefits reviewed thoroughly with the patient,   risks including but not limited to bleeding, perforation, side effects of   medication were informed.  Patient voiced understanding and agreed to proceed.     DESCRIPTION OF PROCEDURE:  Patient was placed in a left lateral decubitus   position after sedation was achieved.  The bite block was inserted in the   mouth and a forward viewing gastroscope was passed carefully and easily under   direct visualization into the esophagus.  All esophageal views were normal.    Examination to the stomach revealed a large 7-10 cm hiatal hernia.  At the   distal portion of the hernia, there were several Byron's erosions,   nonbleeding.  Retroflex views of the hiatal hernia again confirmed erosions   without any other abnormalities.  The stomach was devoid of any active blood,   active bleeding or old or new blood.  Examination of the body was normal.    Antral portion of the stomach revealed a 5 mm healing ulcer without stigmata   for high risk bleed.  There was no  overlying clot, visible vessel or active   bleeding.  The pyloric valve, duodenal bulb, duodenal sweep, second and third   portions of the duodenum was otherwise normal.  The stomach was suctioned,   desufflated.  The scope was removed.     COMPLICATIONS:  None.     BLOOD LOSS:  None.     SPECIMENS:  None.     RECOMMENDATIONS:  1.  NSAID-induced gastropathy as the etiology of the patient's melena.    Patient's bleeding has now abated.  Patient may be transitioned to proton pump   inhibitor therapy in oral fashion, omeprazole 40 mg p.o. q. 12 hours or   equivalent for the next 30 days.  2.  This is the patient's second NSAID-induced GI bleed and patient should be   advised to avoid NSAIDs in the future and she is also prescribed a proton pump   inhibitor.  Patient will be started on regular diet and from a GI   perspective, may be discharged today.        ____________________________________     Ad MODESTO Riley MD    LABORATORY  Lab Results   Component Value Date    SODIUM 139 01/20/2019    POTASSIUM 4.2 01/20/2019    CHLORIDE 106 01/20/2019    CO2 25 01/20/2019    GLUCOSE 96 01/20/2019    BUN 10 01/20/2019    CREATININE 0.78 01/20/2019        Lab Results   Component Value Date    WBC 7.1 01/20/2019    HEMOGLOBIN 12.7 01/20/2019    HEMATOCRIT 39.3 01/20/2019    PLATELETCT 464 (H) 01/20/2019

## 2019-02-07 ENCOUNTER — PATIENT OUTREACH (OUTPATIENT)
Dept: HEALTH INFORMATION MANAGEMENT | Facility: OTHER | Age: 74
End: 2019-02-07

## 2019-02-08 NOTE — PROGRESS NOTES
Ping Kaur was admitted to [facility] on 1/7/19 for a Total Hip Arthroplasty, and was discharged on 1/8/19. [facility] Patient Advocate was able to successfully engage with patient post-discharge. Patient was admitted to Observation-Outpatient on 1/19/19 for Rectal Bleeding, and was discharged on 1/20/19. Per discharge orders, patient had a follow-up appointment with Gunjan Tavarez PA-C (PCP) on 1/24/19. Patient has a future appointment with Dr. Miranda (Orthopedic Surgery) on 2/14/19. PPS screening was not conducted secondary to low LACE score upon hospital discharge.

## 2019-03-06 ENCOUNTER — PATIENT OUTREACH (OUTPATIENT)
Dept: HEALTH INFORMATION MANAGEMENT | Facility: OTHER | Age: 74
End: 2019-03-06

## 2019-03-07 NOTE — PROGRESS NOTES
Call Back at Later Time - Patient requested a call back in 2 months she does want to schedule her mammogram but just had surgery and its going to take time for her to be able to walk around and she needs to go through therapy. I gave her our phone number to call as well.

## 2019-04-16 ENCOUNTER — HOSPITAL ENCOUNTER (OUTPATIENT)
Dept: LAB | Facility: MEDICAL CENTER | Age: 74
End: 2019-04-16
Attending: PHYSICIAN ASSISTANT
Payer: MEDICARE

## 2019-04-16 LAB
CHOLEST SERPL-MCNC: 203 MG/DL (ref 100–199)
FASTING STATUS PATIENT QL REPORTED: NORMAL
HDLC SERPL-MCNC: 79 MG/DL
LDLC SERPL CALC-MCNC: 114 MG/DL
TRIGL SERPL-MCNC: 49 MG/DL (ref 0–149)

## 2019-04-16 PROCEDURE — 36415 COLL VENOUS BLD VENIPUNCTURE: CPT

## 2019-04-16 PROCEDURE — 80061 LIPID PANEL: CPT

## 2019-05-16 NOTE — CARE PLAN
PASRR: [id number].   Problem: Knowledge Deficit  Goal: Knowledge of disease process/condition, treatment plan, diagnostic tests, and medications will improve  POC discussed. Pt understands she will work with therapy before DC. All questions and concerns addressed.    Problem: Pain Management  Goal: Pain level will decrease to patient's comfort goal  Will control pain with PRN pain meds.

## 2019-08-22 ENCOUNTER — OFFICE VISIT (OUTPATIENT)
Dept: URGENT CARE | Facility: PHYSICIAN GROUP | Age: 74
End: 2019-08-22
Payer: MEDICARE

## 2019-08-22 VITALS
SYSTOLIC BLOOD PRESSURE: 140 MMHG | RESPIRATION RATE: 16 BRPM | DIASTOLIC BLOOD PRESSURE: 86 MMHG | BODY MASS INDEX: 30.61 KG/M2 | HEART RATE: 113 BPM | OXYGEN SATURATION: 99 % | WEIGHT: 162 LBS | TEMPERATURE: 98.6 F
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
## 2019-08-22 PROCEDURE — 99204 OFFICE O/P NEW MOD 45 MIN: CPT | Performed by: PHYSICIAN ASSISTANT

## 2019-08-22 RX ORDER — TRIAMCINOLONE ACETONIDE 5 MG/G
CREAM TOPICAL
Qty: 60 G | Refills: 0 | Status: SHIPPED | OUTPATIENT
Start: 2019-08-22

## 2019-08-23 NOTE — PROGRESS NOTES
Chief Complaint   Patient presents with   • Bug Bite     multiple bug bites-thinks a spider-on hand, arm and back of leg        HISTORY OF PRESENT ILLNESS: Patient is a 74 y.o. female who presents today for the following:    Possible spider bites, noticed them over the last 24 to 48 hours  Complains of itching, denies pain  Has lesions on her left hand, left leg, and right leg  Has been using over-the-counter antihistamines and itch cream    Patient Active Problem List    Diagnosis Date Noted   • Acute lower GI hemorrhage 01/19/2019   • Colon cancer screening 03/20/2013   • Other screening mammogram 03/20/2013   • Right knee pain 03/20/2013   • Seborrheic keratosis 03/20/2013   • Annual physical exam 03/20/2013       Allergies:Seasonal; Vicodin [hydrocodone-acetaminophen]; and Lactose    Current Outpatient Medications Ordered in Epic   Medication Sig Dispense Refill   • triamcinolone acetonide (KENALOG) 0.5 % Cream Apply to affected area up to 4 times daily as needed for itching.  Maximum use is 14 days. 60 g 0   • omeprazole (PRILOSEC) 20 MG delayed-release capsule Take 1 Cap by mouth 2 times a day. (Patient not taking: Reported on 8/22/2019) 60 Cap 1   • acetaminophen (TYLENOL) 500 MG Tab Take 1,000 mg by mouth every 6 hours as needed for Mild Pain.     • diphenhydrAMINE (BENADRYL) 25 MG Tab Take 25 mg by mouth every 6 hours as needed.     • sodium chloride (OCEAN) 0.65 % Solution Spray 2 Sprays in nose as needed.     • Pseudoephedrine HCl (SUPHEDRIN PO) Take 10 mg by mouth 2 times a day as needed.       No current Epic-ordered facility-administered medications on file.        Past Medical History:   Diagnosis Date   • Arthritis    • Hiatus hernia syndrome     Had wrap done to repair, but states it came undone.   • Renal disorder 1990's    cysto/lithotripsy for renal stones.   • Right hip pain 12/24/2018    Intermittent   • Seasonal allergies        Social History     Tobacco Use   • Smoking status: Former Smoker      Packs/day: 1.50     Years: 30.00     Pack years: 45.00     Types: Cigarettes     Last attempt to quit:      Years since quittin.6   • Smokeless tobacco: Never Used   Substance Use Topics   • Alcohol use: Yes     Comment: 3 per week   • Drug use: No     Comment: denies h/o heroin, cocaine, meth, IVDU       No family status information on file.   History reviewed. No pertinent family history.    Review of Systems:    Constitutional ROS: No unexpected change in weight, No weakness, No fatigue  Eye ROS: No recent significant change in vision, No eye pain, redness, discharge  Ear ROS: No drainage, No tinnitus or vertigo, No recent change in hearing  Mouth/Throat ROS: No teeth or gum problems, No bleeding gums, No tongue complaints  Neck ROS: No swollen glands, No significant pain in neck  Pulmonary ROS: No chronic cough, sputum, or hemoptysis, No dyspnea on exertion, No wheezing  Cardiovascular ROS: No diaphoresis, No edema, No palpitations  Gastrointestinal ROS: No change in bowel habits, No significant change in appetite, No nausea, vomiting, diarrhea, or constipation  Musculoskeletal/Extremities ROS: No peripheral edema, No pain, redness or swelling on the joints  Hematologic/Lymphatic ROS: No chills, No night sweats, No weight loss  Skin/Integumentary ROS: + itching/insect bites      Exam:  /86   Pulse (!) 113   Temp 37 °C (98.6 °F)   Resp 16   Wt 73.5 kg (162 lb)   SpO2 99%   General: Well developed, well nourished. No distress.  Pulmonary: Unlabored respiratory effort.   Neurologic: Grossly nonfocal. No facial asymmetry noted.  Skin:   Right medial distal upper le mm area of raised erythematous lesion that blanches with pressure.  No induration or tenderness noted.  Left posterior upper le mm area of raised erythematous lesion that blanches with pressure.  No induration or tenderness noted.  Left base of palm: 1 cm area of mild edema, erythema on the ulnar aspect at the base of the  left palm.  No tenderness is noted.  Left index finger, proximal phalanx: 3 mm area of raised, erythematous lesion blanches with pressure.  No induration or tenderness noted.   Psych: Normal mood. Alert and oriented x3. Judgment and insight is normal.    Assessment/Plan:  No signs of infection noted.  Use all medication as directed.  Follow up for worsening or persistent symptoms.  1. Insect bite, unspecified site, initial encounter  triamcinolone acetonide (KENALOG) 0.5 % Cream

## 2022-01-20 NOTE — CONSULTS
DATE OF SERVICE:  01/20/2019    GASTROENTEROLOGY CONSULTATION    REASON FOR CONSULTATION:  Melena.    HISTORY OF PRESENT ILLNESS:  This 74-year-old female who presents status post   right total hip arthroplasty on 01/07/2019 with postoperative pain control,   utilizing aspirin, ibuprofen.  She states that she has been taking some of   these medications on an empty stomach.  She states she reported to the   hospital after 3 days of melena.  Her last melena movement was earlier this   morning.  She reports no cardiopulmonary symptoms.  She has a history of upper   gastrointestinal bleed in 2005, also secondary to NSAID-induced gastropathy.    Since her hospitalization, her hemoglobin dropped from her baseline of   14.7-11 and then has increased slightly to 12.  She has no family history of   GI malignancy.  Her last colonoscopy was within normal limits by Dr. Branch.  During her hospitalization, she was given IV fluids and IV PPI   and GGT formulation.    PAST MEDICAL HISTORY:  1.  Arthritis.  2.  Hiatal hernia.  3.  Renal disorder.  4.  Hip pain.  5.  Seasonal allergies.    PAST SURGICAL HISTORY:  Cholecystectomy, appendectomy, left hip arthroplasty,   breast augmentation, hiatal hernia repair, tonsillectomy, colonoscopy 2015,   lithotripsy, and hip arthroplasty.    FAMILY HISTORY:  No history of GI malignancy.    SOCIAL HISTORY:  No nicotine, alcohol or illicit drug use.  She does have a   45-pack-year history, discontinued 2 years ago.  The patient is .    ALLERGIES:  TO LACTOSE.    OUTPATIENT MEDICATIONS:  Pseudoephedrine, acetaminophen, and Benadryl.    INPATIENT MEDICATIONS:  Normal saline, IV fluids, Protonix 8 mg per hour.    REVIEW OF SYSTEMS:  CONSTITUTIONAL:  No nausea, vomiting, fevers, chills.  No weight loss.  CARDIOVASCULAR:  No chest pain, palpitations.  PULMONARY:  No shortness of breath, dyspnea on exertion.  GASTROINTESTINAL:  No odynophagia and dysphagia.  Please see above for her  Schedule annual GYN exam in 1 year  Schedule mammogram  Call with needs or concerns    MICKIE DE PARTO   Llame a nuestra oficina al [phone] para cualquiera de los siguientes:    * Necesito llamar inmediatamente yo si tengo incluso loli pequeña cantidad de LIQUIDO se escapa de mi vagina, con o sin contracciones  * Marisol llamar si tengo SANGRADO loli cantidad igual o más que un período  [place] cantidad de flujo vaginal sangriento es normal al final del embarazo  * Marisol llamar si tengo CONTRACCIONES cada trina minutos coleman al [employer]  Necesito un reloj para tiempo  Yo marisol tiempo desde el comienzo de loli contracción hasta el comienzo de la siguiente  * De ser necesario ANTES DE  ir al hospital    * Necesito tener un plan para TRANSPORTE a ir al hospital cuando estoy en [place] lion[place]  [place] generalmente no es loli emergencia que requiere loli ambulancia  CUENTAS DEL RETROCESO FETAL    En el tercer trimestre (después de 28 semanas de gestación) deben realizar patada fetal cuentas cada día  Mirza bebé debe moverse al menos 10 veces en 2 horas coleman un tiempo [place], loli vez al día  Elegir el atime del día cuando el bebé es [place]  Trate de hacerlo a la misma hora cada día  Entrar en loli posición cómoda y luego escribir el tiempo que tu bebé se mueve summer  Cuenta cada movimiento hasta que el bebé se mueve 10 veces  Estos movimientos incluyen patadas, puñetazos, codazos, revolotea o rollos  Secretary puede tardar entre 5 minutos a 2 horas  Anote el tiempo que sientes movimiento 10 del bebé  Si ha pasado 2 horas y tu bebé no se ha movido al menos 10 veces, usted debe llamar a la oficina de inmediato  [phone]          MASAJE EN LA VASQUEZ PERINEAL O VAGINAL    Que puedo hacer ahora para reducir las probabilidades de desgarro coleman el parto? Masaje alrededor del orificio de la vagina realizado por usted misma (o por mirza nikhil)    El masaje en esta vasquez, ya sea antes del [place] o   positive melena.  GENITOURINARY:  No dysuria, hematuria.  MUSCULOSKELETAL:  No muscle atrophy.  NEUROLOGIC:  No focal weakness or numbness.  PSYCHIATRIC:  No suicidal or homicidal ideation.  HEENT:  No headache, hearing loss, visual changes, or sore throat.    PHYSICAL EXAMINATION:  VITAL SIGNS:  Stable.  Temperature is afebrile at 97, blood pressure 129/84,   pulse 80, respirations 16, and 95% room air.  GENERAL:  Alert and oriented x4, in no acute distress, speaking in full   sentences.  HEENT:  PERRLA, EOMI.  Sclerae are anicteric.  NECK:  Supple.  HEART:  Regular rhythm.  LUNGS:  Clear to auscultation.  ABDOMEN:  Soft, nontender, and nondistended.  Normoactive bowel sounds.  EXTREMITIES:  No edema.    LABORATORY DATA:  On 12/24/2018, H and H of 14.7 and 45.6.  Admission CBC,   white blood count 8.9, H and H 11.5 and 34.7, platelet count of 438.  Current   H and H of 12.7 and 39.3.  INR of 0.99.  Platelet count 464.    IMPRESSION:  A 74-year-old female with history of NSAID-induced gastropathy in   2005, who presents with recurrent use of NSAIDs, aspirin, and ibuprofen,   status post orthopedic surgeries, hip arthroplasty who presents with melena,   hemoglobin drop from 14 to 11, and has been stabilized with IV fluids and IV   PPI.  Differential diagnosis of patient's melena is likely NSAID-induced   gastropathy, ____ duodenopathy.    RECOMMENDATIONS:  For esophagogastroduodenoscopy for therapeutic intervention   reviewed thoroughly with the patient who voiced understanding and agreed to   proceed.  Risks and benefits of the procedure, risks including but not limited   to bleeding, perforation, side effects of medication were informed.  Patient   voiced understanding and agreed to proceed.       ____________________________________     MD MEET Welsh / DUY    DD:  01/20/2019 09:09:45  DT:  01/20/2019 10:15:30    D#:  [id number]  Job#:  [id number]   coleman la segunda etapa del parto, puede reducir la probabilidad de desgarro perineal coleman el parto  Asimismo, el uso de compresas tibias en el perineo coleman la etapa expulsiva del parto puede reducir la pravedad del desparro  [place] sucedera coleman la etapa expulsiva del parto  En casa tambien puede reducir las probabilidades de sufrir lesiones durnate el parto de con masajes en la cassandra perineal     Abby Needy? Todas las mujeres embarazadas a partir de, [place], las 34 semanas de [facility]  Cuando? Usted o mirza nikhil deben hacer masajes en la cassandra vaginal coleman 5 a 10 minutos de 1 a 4 veces por semana  [place]? Use loli mezcla de agua y aceite de Wm, luis u beebe con 1 o 2 dedos (ling la comodidad)  Inserte los dedos en la vagina entre 3 y 5cm  Aplique presion general hacia abajo y [employer] costados coleman 5 a [address] 3 y las 9 horas, de 1 a 4 veces por semana  GROUP B STREP    Group B Strep (GBS) es loli bacteria vaginal común  Aproximadamente el 25% de las mujeres normalmente tienen la bacteria GBS en la vagina  NO es loli infección de transmisión sexual  ¡De hecho, no es loli infección! Es solo loli bacteria vaginal normal para muchas mujeres  Sin embargo, la bacteria GBS puede ser peligrosas para un bebé recién nacido si el bebé está expuesto a azul bacteria particular coleman el parto y el nacimiento Y desarrolla loli infección de la bacteria  La probabilidad de loli infección de GBS en recién nacidos para loli jarred que tiene las bacteria GBS en la vagina es cerca de 1% - 2%  Bk si la infeccion produce, un bebé podría ser gravemente enfermo  Por esta razón, hacemos un cultivo vaginal (Q-Tip de la vagina y el recto) para todas las mujeres embarazadas en el aproximadamente [facility]   Si el cultivo demuestra que hay bacteria GBS presente, no es loli razón para el pánico! [employer] en esta situación dará andrade antibióticos de la jarred a través de mirza IV mientras está en parto  Cuando loli madre se trata con antibióticos coleman el parto, mirza bebé VAISHNAVI NUNCA se infecta con la bacteria GBS

## 2023-08-10 NOTE — OP REPORT
DATE OF OPERATION: 1/7/2019      PREOPERATIVE DIAGNOSES:   1. Osteoarthritis, right hip.     POSTOPERATIVE DIAGNOSES:   1. Osteoarthritis, right hip.   2.  Gluteal tendon tear right hip    PROCEDURES:   1. Right total hip arthroplasty.   2. Intraoperative block for rtelief of extensive postoperative pain.  3.  Gluteal tendon repair right hip    SURGEON: Malachi Miranda MD   ASSISTANT: Samra Rucker    ANESTHESIA: General, Dr Felix    IMPLANTS: Ynes size 54 Trilogy cup with a size 10 red std mL taper stem with   a 32 mm 0 ceramic head with and a 32 mm flat cross-linked polyethylene.     WEIGHTBEARING: As tolerated.     FINDINGS:   1. Advanced chondromalacic changes of the femoral head.  2.  Gluteal tendon tear right hip predominantly in the minimus    PROCEDURE IN DETAIL:   The patient was seen in the preop holding area and consent reviewed. The right lower extremity was marked with patient. Patient was taken to the operating room where general anesthesia was given. The body-exhaust uniforms were utilized. Perioperative antibiotics were given The patient then was positioned in a lateral decubitus position with axillary roll and Stulberg.  All bony prominences were padded. The right lower extremity was addressed with a Hibiclens, alcohol and ChloraPrep. Then sterile draping technique was performed. Air isolation draping was undertaken. An appropriate timeout was  undertaken.        The surgery was initiated with a minimally invasive posterior approach. Skin   and subcutaneous tissue were incised. Hemostasis was obtained. The fascia   aroldo and gluteal fascia were split. Short external rotators were taken down   as a single layer. T-capsular incision was made.  The hip was dislocated.     Intraoperative periarticular and extensive diego-incisional block was undertaken. This was undertaken with a combination of Toradol ropivacaine and morphine to treat postoperative pain. This was done extensively throughout the  pericapsular tissues, followed by an extensive block in the peritrochanteric tissues, followed by an extensive tissue infusion into the subcutaneous tissues. This obtained a significant intraoperative improvement in anesthesia requirements as well as pain management and vitals stabilization.    Following this, the acetabulum was addressed with Labral excision and serial reaming.  Next, the acetabulum was reverse reamed with bone graft  and the Trilogy cup inserted. The polyethylene was inserted after pulse lavage and the locking mechanism checked.     At this point, the femur was addressed.  Cookie cutter was used.  Then serial reaming   and broaching was undertaken.  Trial reductions were undertaken with reproduction and stabilization in the patient's length and offset. The final stem was then inserted.  A trial reductions repeated and  neck length and head chosen.  After pulse lavage,  cleaning and drying the Cantu taper the head was inserted and the hip was relocated, full extension, external rotation stability as well as sleeping stability to 80 degrees and flexed 90 stability to 50 degrees was noted. Pulse lavage was repeated. T-capsular closure was done with #2 FiberWire. Short external rotator repair over bone bridges with #2 FiberWire.    Following the repair of the hip the gluteal tendon on the minimus was noted to have a prominent tear.  For this reason the gluteus minimus was repaired with a split style repair over bone bridges after rondure and decompression of the greater tuberosity and anterolateral facet to obtain a bleeding bone base.  A rotator cuff style repair was undertaken and supplemented.    The fascia aroldo repaired with #1 Quill barbed suture  The  subcutaneous 0 and 2-0 Monocryl and then closure of the skin. Patient was placed into a sterile bandage. The patient was  awoken from anesthetic and taken to the recovery room, tolerated the procedure very well with no signs of complications.  4 = No assist / stand by assistance

## (undated) DEVICE — PEN SKIN MARKER W/RULER - (50EA/BX)

## (undated) DEVICE — CHLORAPREP 26 ML APPLICATOR - ORANGE TINT(25/CA)

## (undated) DEVICE — SENSOR SPO2 NEO LNCS ADHESIVE (20/BX) SEE USER NOTES

## (undated) DEVICE — NEEDLE SPINAL NON-SAFETY 18 GA X 3 IN (25EA/BX)

## (undated) DEVICE — HEAD HOLDER JUNIOR/ADULT

## (undated) DEVICE — PAD UNIVERSAL MULTI USE (1/EA)

## (undated) DEVICE — NEPTUNE 4 PORT MANIFOLD - (20/PK)

## (undated) DEVICE — TOWELS CLOTH SURGICAL - (4/PK 20PK/CA)

## (undated) DEVICE — DRESSING INTEGUSEAL MICROBIAL SEALANT IS100 (20EA/CA)

## (undated) DEVICE — GLOVE, LITE (PAIR)

## (undated) DEVICE — DERMABOND ADVANCED - (12EA/BX)

## (undated) DEVICE — FIBERWIRE 2.0 (12EA/BX)

## (undated) DEVICE — SUTURE 0 PDS-2 CTX 36 INCH - (24/BX)

## (undated) DEVICE — GLOVE BIOGEL INDICATOR SZ 6.5 SURGICAL PF LTX - (50PR/BX 4BX/CA)

## (undated) DEVICE — HANDPIECE 10FT INTPLS SCT PLS IRRIGATION HAND CONTROL SET (6/PK)

## (undated) DEVICE — WATER IRRIGATION STERILE 1000ML (12EA/CA)

## (undated) DEVICE — DRAPE LARGE 3 QUARTER - (20/CA)

## (undated) DEVICE — KIT ROOM DECONTAMINATION

## (undated) DEVICE — CONTAINER SPECIMEN BAG OR - STERILE 4 OZ W/LID (100EA/CA)

## (undated) DEVICE — ELECTRODE 850 FOAM ADHESIVE - HYDROGEL RADIOTRNSPRNT (50/PK)

## (undated) DEVICE — KIT ANESTHESIA W/CIRCUIT & 3/LT BAG W/FILTER (20EA/CA)

## (undated) DEVICE — SUTURE GENERAL

## (undated) DEVICE — SODIUM CHL IRRIGATION 0.9% 1000ML (12EA/CA)

## (undated) DEVICE — BLADE SAGITTAL SAW DUAL CUT 75.0 X 25.0MM (1/EA)

## (undated) DEVICE — SUTURE 1 PDS-2 PLUS CTX - (24/BX)

## (undated) DEVICE — BAG, SPONGE COUNT 50600

## (undated) DEVICE — SYRINGE DISP. 60 CC LL - (30/BX, 12BX/CA)**WHEN THESE ARE GONE ORDER #500206**

## (undated) DEVICE — PROTECTOR ULNA NERVE - (36PR/CA)

## (undated) DEVICE — PACK TOTAL HIP - (1/CA)

## (undated) DEVICE — GLOVE BIOGEL PI ULTRATOUCH SZ 6.5 SURGICAL PF LF - (50/BX)

## (undated) DEVICE — BANDAGE ELASTIC STERILE VELCRO 6 X 5 YDS (25EA/CA)

## (undated) DEVICE — GLOVE BIOGEL PI INDICATOR SZ 7.0 SURGICAL PF LF - (50/BX 4BX/CA)

## (undated) DEVICE — PILLOW ABDUCTION HIP SMALL - (6EA/CA)

## (undated) DEVICE — CANNULA W/ SUPPLY TUBING O2 - (50/CA)

## (undated) DEVICE — GOWN SURGICAL X-LARGE ULTRA - FILM-REINFORCED (20/CA)

## (undated) DEVICE — MASK, LARYNGEAL AIRWAY #4

## (undated) DEVICE — SUTURE 2 TICRON BLUE GS-21 (36EA/CA)

## (undated) DEVICE — DRESSING AQUACEL AG ADVANTAGE 3.5 X 10" (10EA/BX)"

## (undated) DEVICE — HUMID-VENT HEAT AND MOISTURE EXCHANGE- (50/BX)

## (undated) DEVICE — SUCTION INSTRUMENT YANKAUER BULBOUS TIP W/O VENT (50EA/CA)

## (undated) DEVICE — GLOVE BIOGEL SZ 6.5 SURGICAL PF LTX (50PR/BX 4BX/CA)

## (undated) DEVICE — GLOVE PROTEXIS LATEX SIZE 9 (40PR/BX)

## (undated) DEVICE — GLOVE PROTEXIS LATEX MICRO SIZE 9 (50PR/BX)

## (undated) DEVICE — GLOVE BIOGEL PI ULTRATOUCH SZ 7.0 SURGICAL PF LF- POWDER FREE (50/BX 4BX/CA)

## (undated) DEVICE — ELECTRODE DUAL RETURN W/ CORD - (50/PK)

## (undated) DEVICE — SUTURE 2-0 MONOCRYL PLUS UNDYED CT-1 1 X 36 (36EA/BX)"

## (undated) DEVICE — LACTATED RINGERS INJ 1000 ML - (14EA/CA 60CA/PF)

## (undated) DEVICE — MASK ANESTHESIA ADULT  - (100/CA)

## (undated) DEVICE — DRAPE U SPLIT IMP 54 X 76 - (24/CA)

## (undated) DEVICE — DRILL BIT

## (undated) DEVICE — STAPLER SKIN DISP - (6/BX 10BX/CA) VISISTAT

## (undated) DEVICE — SODIUM CHL. IRRIGATION 0.9% 3000ML (4EA/CA 65CA/PF)

## (undated) DEVICE — CANISTER SUCTION RIGID RED 1500CC (40EA/CA)

## (undated) DEVICE — TIP INTPLS HFLO ML ORFC BTRY - (12/CS)  FOR SURGILAV

## (undated) DEVICE — GOWN WARMING STANDARD FLEX - (30/CA)

## (undated) DEVICE — TUBE CONNECTING SUCTION - CLEAR PLASTIC STERILE 72 IN (50EA/CA)

## (undated) DEVICE — LENS/HOOD FOR SPACESUIT - (32/PK) PEEL AWAY FACE